# Patient Record
Sex: MALE | Race: ASIAN | NOT HISPANIC OR LATINO
[De-identification: names, ages, dates, MRNs, and addresses within clinical notes are randomized per-mention and may not be internally consistent; named-entity substitution may affect disease eponyms.]

---

## 2017-06-20 ENCOUNTER — LAB SERVICES (OUTPATIENT)
Dept: OTHER | Age: 82
End: 2017-06-20

## 2017-06-20 ENCOUNTER — CHARTING TRANS (OUTPATIENT)
Dept: OTHER | Age: 82
End: 2017-06-20

## 2017-06-21 ENCOUNTER — CHARTING TRANS (OUTPATIENT)
Dept: OTHER | Age: 82
End: 2017-06-21

## 2017-06-21 LAB
ALBUMIN SERPL-MCNC: 3.6 G/DL (ref 3.6–5.1)
ALBUMIN/GLOB SERPL: 1 (ref 1–2.4)
ALP SERPL-CCNC: 102 UNITS/L (ref 45–117)
ALT SERPL-CCNC: 28 UNITS/L
ANION GAP SERPL CALC-SCNC: 14 MMOL/L (ref 10–20)
AST SERPL-CCNC: 27 UNITS/L
BASOPHILS # BLD: 0 K/MCL (ref 0–0.3)
BASOPHILS NFR BLD: 1 %
BILIRUB SERPL-MCNC: 0.3 MG/DL (ref 0.2–1)
BUN SERPL-MCNC: 16 MG/DL (ref 6–20)
BUN/CREAT SERPL: 18 (ref 7–25)
CALCIUM SERPL-MCNC: 8.8 MG/DL (ref 8.4–10.2)
CHLORIDE SERPL-SCNC: 108 MMOL/L (ref 98–107)
CHOLEST SERPL-MCNC: 93 MG/DL
CHOLEST/HDLC SERPL: 2.4
CO2 SERPL-SCNC: 24 MMOL/L (ref 21–32)
CREAT SERPL-MCNC: 0.87 MG/DL (ref 0.67–1.17)
DIFFERENTIAL METHOD BLD: ABNORMAL
EOSINOPHIL # BLD: 0 K/MCL (ref 0.1–0.5)
EOSINOPHIL NFR BLD: 0 %
ERYTHROCYTE [DISTWIDTH] IN BLOOD: 17 % (ref 11–15)
GLOBULIN SER-MCNC: 3.5 G/DL (ref 2–4)
GLUCOSE SERPL-MCNC: 108 MG/DL (ref 65–99)
HDLC SERPL-MCNC: 38 MG/DL
HEMATOCRIT: 24.3 % (ref 39–51)
HEMOGLOBIN: 7.2 G/DL (ref 13–17)
IRON SERPL-MCNC: 135 MCG/DL (ref 65–175)
LDLC SERPL CALC-MCNC: 39 MG/DL
LENGTH OF FAST TIME PATIENT: 12 HRS
LENGTH OF FAST TIME PATIENT: 12 HRS
LYMPHOCYTES # BLD: 0.7 K/MCL (ref 1–4)
LYMPHOCYTES NFR BLD: 32 %
MEAN CORPUSCULAR HEMOGLOBIN: 25.4 PG (ref 26–34)
MEAN CORPUSCULAR HGB CONC: 29.6 G/DL (ref 32–36.5)
MEAN CORPUSCULAR VOLUME: 85.6 FL (ref 78–100)
MONOCYTES # BLD: 0.3 K/MCL (ref 0.3–0.9)
MONOCYTES NFR BLD: 14 %
NEUTROPHILS # BLD: 1.2 K/MCL (ref 1.8–7.7)
NEUTROPHILS NFR BLD: 53 %
NONHDLC SERPL-MCNC: 55 MG/DL
PLATELET COUNT: 182 K/MCL (ref 140–450)
POTASSIUM SERPL-SCNC: 4.6 MMOL/L (ref 3.4–5.1)
RED CELL COUNT: 2.84 MIL/MCL (ref 4.5–5.9)
SODIUM SERPL-SCNC: 141 MMOL/L (ref 135–145)
TOTAL PROTEIN: 7.1 G/DL (ref 6.4–8.2)
TRIGL SERPL-MCNC: 78 MG/DL
TSH SERPL-ACNC: 2.03 MCUNITS/ML (ref 0.35–5)
WHITE BLOOD COUNT: 2.2 K/MCL (ref 4.2–11)

## 2017-06-26 ENCOUNTER — CHARTING TRANS (OUTPATIENT)
Dept: OTHER | Age: 82
End: 2017-06-26

## 2017-06-26 ASSESSMENT — PAIN SCALES - GENERAL: PAINLEVEL_OUTOF10: 0

## 2017-06-27 LAB
FOLATE SERPL-MCNC: 10.1 NG/ML
VIT B12 SERPL-MCNC: 589 PG/ML (ref 211–911)

## 2017-07-03 ENCOUNTER — LAB SERVICES (OUTPATIENT)
Dept: OTHER | Age: 82
End: 2017-07-03

## 2017-07-06 LAB — HEMOCCULT STL QL: NEGATIVE

## 2017-07-11 ENCOUNTER — LAB SERVICES (OUTPATIENT)
Dept: OTHER | Age: 82
End: 2017-07-11

## 2017-07-11 ENCOUNTER — CHARTING TRANS (OUTPATIENT)
Dept: OTHER | Age: 82
End: 2017-07-11

## 2017-07-12 ENCOUNTER — CHARTING TRANS (OUTPATIENT)
Dept: OTHER | Age: 82
End: 2017-07-12

## 2017-07-12 LAB
BASOPHILS # BLD: 0 K/MCL (ref 0–0.3)
BASOPHILS NFR BLD: 0 %
DIFFERENTIAL METHOD BLD: ABNORMAL
EOSINOPHIL # BLD: 0 K/MCL (ref 0.1–0.5)
EOSINOPHIL NFR BLD: 0 %
ERYTHROCYTE [DISTWIDTH] IN BLOOD: 18.9 % (ref 11–15)
HEMATOCRIT: 29.8 % (ref 39–51)
HEMOGLOBIN: 8.8 G/DL (ref 13–17)
LYMPHOCYTES # BLD: 1.1 K/MCL (ref 1–4)
LYMPHOCYTES NFR BLD: 40 %
MEAN CORPUSCULAR HEMOGLOBIN: 24.6 PG (ref 26–34)
MEAN CORPUSCULAR HGB CONC: 29.5 G/DL (ref 32–36.5)
MEAN CORPUSCULAR VOLUME: 83.5 FL (ref 78–100)
MONOCYTES # BLD: 0.3 K/MCL (ref 0.3–0.9)
MONOCYTES NFR BLD: 9 %
NEUTROPHILS # BLD: 1.4 K/MCL (ref 1.8–7.7)
NEUTROPHILS NFR BLD: 51 %
PLATELET COUNT: 202 K/MCL (ref 140–450)
RED CELL COUNT: 3.57 MIL/MCL (ref 4.5–5.9)
RETICULOCYTE HEMOGLOBIN CONTENT (RETHE): 24.2 PG (ref 28.6–36.3)
WHITE BLOOD COUNT: 2.8 K/MCL (ref 4.2–11)

## 2017-11-17 ENCOUNTER — OFFICE VISIT (OUTPATIENT)
Dept: AUDIOLOGY | Facility: CLINIC | Age: 82
End: 2017-11-17

## 2017-11-17 DIAGNOSIS — H90.3 BILATERAL SENSORINEURAL HEARING LOSS: Primary | ICD-10-CM

## 2017-11-17 PROCEDURE — 92593 HEARING AID CHECK, BOTH EARS: CPT | Performed by: AUDIOLOGIST

## 2017-11-17 NOTE — PROGRESS NOTES
HEARING AID FOLLOW-UP    Alma Case  1/29/1935  [id number]    Patient is here for hearing aid follow up. Last visit was one year ago.       The patient has the following concerns:   Patient here with wife and reported that both aids were lost.   They

## 2017-12-07 ENCOUNTER — OFFICE VISIT (OUTPATIENT)
Dept: AUDIOLOGY | Facility: CLINIC | Age: 82
End: 2017-12-07

## 2017-12-07 DIAGNOSIS — H90.3 SENSORINEURAL HEARING LOSS, BILATERAL: Primary | ICD-10-CM

## 2017-12-07 PROCEDURE — V5299 HEARING SERVICE: HCPCS | Performed by: AUDIOLOGIST

## 2017-12-07 PROCEDURE — 92593 HEARING AID CHECK, BOTH EARS: CPT | Performed by: AUDIOLOGIST

## 2017-12-08 NOTE — PROGRESS NOTES
HEARING AID FOLLOW-UP    [facility]  1/29/1935  [id number]    Patient is here for  of replacement aid for right ear under [employer] loss/damage policy. The patient has the following concerns:      In office the following actions were taken:  Martha Cadena

## 2018-03-08 ENCOUNTER — LAB SERVICES (OUTPATIENT)
Dept: OTHER | Age: 83
End: 2018-03-08

## 2018-03-08 ENCOUNTER — CHARTING TRANS (OUTPATIENT)
Dept: OTHER | Age: 83
End: 2018-03-08

## 2018-03-09 ENCOUNTER — LAB SERVICES (OUTPATIENT)
Dept: OTHER | Age: 83
End: 2018-03-09

## 2018-03-09 ENCOUNTER — HOSPITAL (OUTPATIENT)
Dept: OTHER | Age: 83
End: 2018-03-09
Attending: INTERNAL MEDICINE

## 2018-03-09 ENCOUNTER — CHARTING TRANS (OUTPATIENT)
Dept: OTHER | Age: 83
End: 2018-03-09

## 2018-03-09 LAB
ALBUMIN SERPL-MCNC: 3.6 G/DL (ref 3.6–5.1)
ALBUMIN/GLOB SERPL: 1.2 (ref 1–2.4)
ALP SERPL-CCNC: 84 UNITS/L (ref 45–117)
ALT SERPL-CCNC: 22 UNITS/L
ANALYZER ANC (IANC): ABNORMAL
ANALYZER ANC (IANC): ABNORMAL
ANION GAP SERPL CALC-SCNC: 12 MMOL/L (ref 10–20)
ANION GAP SERPL CALC-SCNC: 12 MMOL/L (ref 10–20)
ANION GAP SERPL CALC-SCNC: 13 MMOL/L (ref 10–20)
AST SERPL-CCNC: 22 UNITS/L
BASOPHILS # BLD: 0 K/MCL (ref 0–0.3)
BASOPHILS # BLD: 0 K/MCL (ref 0–0.3)
BASOPHILS # BLD: 0 THOUSAND/MCL (ref 0–0.3)
BASOPHILS NFR BLD: 0 %
BILIRUB SERPL-MCNC: 0.6 MG/DL (ref 0.2–1)
BLOOD BANK CMNT PATIENT-IMP: NORMAL
BUN SERPL-MCNC: 17 MG/DL (ref 6–20)
BUN SERPL-MCNC: 21 MG/DL (ref 6–20)
BUN SERPL-MCNC: 21 MG/DL (ref 6–20)
BUN/CREAT SERPL: 19 (ref 7–25)
BUN/CREAT SERPL: 22 (ref 7–25)
BUN/CREAT SERPL: 22 (ref 7–25)
CALCIUM SERPL-MCNC: 8.9 MG/DL (ref 8.4–10.2)
CHLORIDE SERPL-SCNC: 107 MMOL/L (ref 98–107)
CHLORIDE SERPL-SCNC: 109 MMOL/L (ref 98–107)
CHLORIDE: 107 MMOL/L (ref 98–107)
CO2 SERPL-SCNC: 24 MMOL/L (ref 21–32)
CO2 SERPL-SCNC: 26 MMOL/L (ref 21–32)
CO2 SERPL-SCNC: 26 MMOL/L (ref 21–32)
CREAT SERPL-MCNC: 0.88 MG/DL (ref 0.67–1.17)
CREAT SERPL-MCNC: 0.96 MG/DL (ref 0.67–1.17)
CREAT SERPL-MCNC: 0.96 MG/DL (ref 0.67–1.17)
DAT POLY-SP REAG RBC QL: NEGATIVE
DIFFERENTIAL METHOD BLD: ABNORMAL
EOSINOPHIL # BLD: 0.1 K/MCL (ref 0.1–0.5)
EOSINOPHIL # BLD: 0.1 K/MCL (ref 0.1–0.5)
EOSINOPHIL # BLD: 0.1 THOUSAND/MCL (ref 0.1–0.5)
EOSINOPHIL NFR BLD: 1 %
EOSINOPHIL NFR BLD: 3 %
EOSINOPHIL NFR BLD: 3 %
ERYTHROCYTE [DISTWIDTH] IN BLOOD: 18.7 % (ref 11–15)
ERYTHROCYTE [DISTWIDTH] IN BLOOD: 18.7 % (ref 11–15)
ERYTHROCYTE [DISTWIDTH] IN BLOOD: 18.8 % (ref 11–15)
FOLATE SERPL-MCNC: 12.9 NG/ML
GLOBULIN SER-MCNC: 3.1 G/DL (ref 2–4)
GLUCOSE SERPL-MCNC: 96 MG/DL (ref 65–99)
GLUCOSE SERPL-MCNC: 97 MG/DL (ref 65–99)
GLUCOSE SERPL-MCNC: 97 MG/DL (ref 65–99)
HEMATOCRIT: 22.3 % (ref 39–51)
HEMATOCRIT: 22.3 % (ref 39–51)
HEMATOCRIT: 24.1 % (ref 39–51)
HEMOGLOBIN: 6.5 G/DL (ref 13–17)
HEMOGLOBIN: 7 G/DL (ref 13–17)
HGB BLD-MCNC: 6.5 GM/DL (ref 13–17)
HYPOCHROMIA (HYPOC): ABNORMAL
HYPOCHROMIA (HYPOC): ABNORMAL
IRON SATN MFR SERPL: 38 % (ref 15–45)
IRON SATN MFR SERPL: 38 % (ref 15–45)
IRON SERPL-MCNC: 130 MCG/DL (ref 65–175)
IRON SERPL-MCNC: 130 MCG/DL (ref 65–175)
IRON SERPL-MCNC: 25 MCG/DL (ref 65–175)
LDH SERPL L TO P-CCNC: 236 UNITS/L (ref 86–234)
LDH SERPL-CCNC: 236 UNIT/L (ref 86–234)
LENGTH OF FAST TIME PATIENT: ABNORMAL HRS
LYMPHOCYTES # BLD: 0.7 K/MCL (ref 1–4)
LYMPHOCYTES # BLD: 0.7 K/MCL (ref 1–4)
LYMPHOCYTES # BLD: 0.7 THOUSAND/MCL (ref 1–4)
LYMPHOCYTES NFR BLD: 13 %
LYMPHOCYTES NFR BLD: 15 %
LYMPHOCYTES NFR BLD: 15 %
MACROCYTOSIS (MACRO): ABNORMAL
MCH RBC QN AUTO: 29.7 PG (ref 26–34)
MCHC RBC AUTO-ENTMCNC: 29.1 GM/DL (ref 32–36.5)
MCV RBC AUTO: 101.8 FL (ref 78–100)
MEAN CORPUSCULAR HEMOGLOBIN: 29.7 PG (ref 26–34)
MEAN CORPUSCULAR HEMOGLOBIN: 30.2 PG (ref 26–34)
MEAN CORPUSCULAR HGB CONC: 29 G/DL (ref 32–36.5)
MEAN CORPUSCULAR HGB CONC: 29.1 G/DL (ref 32–36.5)
MEAN CORPUSCULAR VOLUME: 101.8 FL (ref 78–100)
MEAN CORPUSCULAR VOLUME: 103.9 FL (ref 78–100)
MICROCYTOSIS (MICY): ABNORMAL
MICROCYTOSIS (MICY): ABNORMAL
MONOCYTES # BLD: 0.2 K/MCL (ref 0.3–0.9)
MONOCYTES # BLD: 0.2 K/MCL (ref 0.3–0.9)
MONOCYTES # BLD: 0.2 THOUSAND/MCL (ref 0.3–0.9)
MONOCYTES NFR BLD: 4 %
MONOCYTES NFR BLD: 5 %
MONOCYTES NFR BLD: 5 %
NEUTROPHILS # BLD: 3.5 K/MCL (ref 1.8–7.7)
NEUTROPHILS # BLD: 3.5 THOUSAND/MCL (ref 1.8–7.7)
NEUTROPHILS # BLD: 4.4 K/MCL (ref 1.8–7.7)
NEUTROPHILS NFR BLD: 77 %
NEUTROPHILS NFR BLD: 77 %
NEUTROPHILS NFR BLD: 82 %
NEUTS SEG NFR BLD: ABNORMAL
NEUTS SEG NFR BLD: ABNORMAL %
NRBC (NRBCRE): ABNORMAL
NUM BPU REQUESTED: 1
PERCENT NRBC: ABNORMAL
PLATELET # BLD: 187 THOUSAND/MCL (ref 140–450)
PLATELET COUNT: 187 K/MCL (ref 140–450)
PLATELET COUNT: 218 K/MCL (ref 140–450)
POLYCHROMASIA (POLY): ABNORMAL
POTASSIUM SERPL-SCNC: 4.2 MMOL/L (ref 3.4–5.1)
POTASSIUM SERPL-SCNC: 4.2 MMOL/L (ref 3.4–5.1)
POTASSIUM SERPL-SCNC: 4.4 MMOL/L (ref 3.4–5.1)
RBC # BLD: 2.19 MILLION/MCL (ref 4.5–5.9)
RED CELL COUNT: 2.19 MIL/MCL (ref 4.5–5.9)
RED CELL COUNT: 2.32 MIL/MCL (ref 4.5–5.9)
RETICULOCYTE HEMOGLOBIN CONTENT (RETHE): 24.4 PG (ref 28.6–36.3)
SODIUM SERPL-SCNC: 141 MMOL/L (ref 135–145)
SODIUM SERPL-SCNC: 141 MMOL/L (ref 135–145)
SODIUM SERPL-SCNC: 142 MMOL/L (ref 135–145)
TIBC SERPL-MCNC: 345 MCG/DL (ref 250–450)
TIBC SERPL-MCNC: 345 MCG/DL (ref 250–450)
TOTAL PROTEIN: 6.7 G/DL (ref 6.4–8.2)
VIT B12 SERPL-MCNC: 455 PG/ML (ref 211–911)
WBC # BLD: 4.6 THOUSAND/MCL (ref 4.2–11)
WHITE BLOOD COUNT: 4.6 K/MCL (ref 4.2–11)
WHITE BLOOD COUNT: 5.4 K/MCL (ref 4.2–11)

## 2018-03-10 ENCOUNTER — DIAGNOSTIC TRANS (OUTPATIENT)
Dept: OTHER | Age: 83
End: 2018-03-10

## 2018-03-10 ENCOUNTER — CHARTING TRANS (OUTPATIENT)
Dept: OTHER | Age: 83
End: 2018-03-10

## 2018-03-10 ENCOUNTER — LAB SERVICES (OUTPATIENT)
Dept: OTHER | Age: 83
End: 2018-03-10

## 2018-03-10 LAB
ANALYZER ANC (IANC): ABNORMAL
ANALYZER ANC (IANC): ABNORMAL
ANION GAP SERPL CALC-SCNC: 15 MMOL/L (ref 10–20)
ANION GAP SERPL CALC-SCNC: 15 MMOL/L (ref 10–20)
BASOPHILS # BLD: 0 K/MCL (ref 0–0.3)
BASOPHILS # BLD: 0 THOUSAND/MCL (ref 0–0.3)
BASOPHILS NFR BLD: 0 %
BASOPHILS NFR BLD: 0 %
BUN SERPL-MCNC: 15 MG/DL (ref 6–20)
BUN SERPL-MCNC: 15 MG/DL (ref 6–20)
BUN/CREAT SERPL: 15 (ref 7–25)
BUN/CREAT SERPL: 15 (ref 7–25)
CALCIUM SERPL-MCNC: 8.9 MG/DL (ref 8.4–10.2)
CALCIUM SERPL-MCNC: 8.9 MG/DL (ref 8.4–10.2)
CHLORIDE SERPL-SCNC: 107 MMOL/L (ref 98–107)
CHLORIDE: 107 MMOL/L (ref 98–107)
CO2 SERPL-SCNC: 23 MMOL/L (ref 21–32)
CO2 SERPL-SCNC: 23 MMOL/L (ref 21–32)
CREAT SERPL-MCNC: 0.97 MG/DL (ref 0.67–1.17)
CREAT SERPL-MCNC: 0.97 MG/DL (ref 0.67–1.17)
DIFFERENTIAL METHOD BLD: ABNORMAL
DIFFERENTIAL METHOD BLD: ABNORMAL
EOSINOPHIL # BLD: 0.2 K/MCL (ref 0.1–0.5)
EOSINOPHIL # BLD: 0.2 THOUSAND/MCL (ref 0.1–0.5)
EOSINOPHIL NFR BLD: 5 %
EOSINOPHIL NFR BLD: 5 %
ERYTHROCYTE [DISTWIDTH] IN BLOOD: 21.1 % (ref 11–15)
ERYTHROCYTE [DISTWIDTH] IN BLOOD: 21.1 % (ref 11–15)
FOLATE SERPL-MCNC: 12.9 NG/ML
FOLATE SERPL-MCNC: 13.9 NG/ML
GLUCOSE SERPL-MCNC: 95 MG/DL (ref 65–99)
GLUCOSE SERPL-MCNC: 95 MG/DL (ref 65–99)
HEMATOCRIT: 27.5 % (ref 39–51)
HEMATOCRIT: 27.5 % (ref 39–51)
HEMOGLOBIN: 8.4 G/DL (ref 13–17)
HGB BLD-MCNC: 8.4 GM/DL (ref 13–17)
IMMATURE RETIC FRACTION: 26.8 % (ref 1.5–16)
LYMPHOCYTES # BLD: 0.8 K/MCL (ref 1–4)
LYMPHOCYTES # BLD: 0.8 THOUSAND/MCL (ref 1–4)
LYMPHOCYTES NFR BLD: 19 %
LYMPHOCYTES NFR BLD: 19 %
MCH RBC QN AUTO: 29 PG (ref 26–34)
MCHC RBC AUTO-ENTMCNC: 30.5 GM/DL (ref 32–36.5)
MCV RBC AUTO: 94.8 FL (ref 78–100)
MEAN CORPUSCULAR HEMOGLOBIN: 29 PG (ref 26–34)
MEAN CORPUSCULAR HGB CONC: 30.5 G/DL (ref 32–36.5)
MEAN CORPUSCULAR VOLUME: 94.8 FL (ref 78–100)
MONOCYTES # BLD: 0.3 K/MCL (ref 0.3–0.9)
MONOCYTES # BLD: 0.3 THOUSAND/MCL (ref 0.3–0.9)
MONOCYTES NFR BLD: 8 %
MONOCYTES NFR BLD: 8 %
NEUTROPHILS # BLD: 2.8 K/MCL (ref 1.8–7.7)
NEUTROPHILS # BLD: 2.8 THOUSAND/MCL (ref 1.8–7.7)
NEUTROPHILS NFR BLD: 68 %
NEUTROPHILS NFR BLD: 68 %
NEUTS SEG NFR BLD: ABNORMAL
NEUTS SEG NFR BLD: ABNORMAL %
NRBC (NRBCRE): ABNORMAL
PERCENT NRBC: ABNORMAL
PLATELET # BLD: 167 THOUSAND/MCL (ref 140–450)
PLATELET COUNT: 167 K/MCL (ref 140–450)
POTASSIUM SERPL-SCNC: 3.9 MMOL/L (ref 3.4–5.1)
POTASSIUM SERPL-SCNC: 3.9 MMOL/L (ref 3.4–5.1)
RBC # BLD: 2.9 MILLION/MCL (ref 4.5–5.9)
RED CELL COUNT: 2.9 MIL/MCL (ref 4.5–5.9)
RETICS #: 150 THOUSAND/MCL (ref 10–120)
RETICS/RBC NFR: 5.2 % (ref 0.3–2.5)
SODIUM SERPL-SCNC: 141 MMOL/L (ref 135–145)
SODIUM SERPL-SCNC: 141 MMOL/L (ref 135–145)
VIT B12 SERPL-MCNC: 455 PG/ML (ref 211–911)
VIT B12 SERPL-MCNC: 492 PG/ML (ref 211–911)
WBC # BLD: 4.1 THOUSAND/MCL (ref 4.2–11)
WHITE BLOOD COUNT: 4.1 K/MCL (ref 4.2–11)

## 2018-03-11 LAB
ABO + RH BLD: NORMAL
BLD GP AB SCN SERPL QL: NEGATIVE
BLD PROD TYP BPU: NORMAL
BLD UNIT ID BPU: NORMAL
CROSSMATCH EXPIRE: NORMAL
FOLATE SERPL-MCNC: 13.9 NG/ML
MAJ XM SERPL-IMP: NORMAL
NUM BPU REQUESTED: 2
STATUS OF UNIT: NORMAL
TRANSFUSION STATUS: NORMAL
UNIT DIVISION: 0
VIT B12 SERPL-MCNC: 492 PG/ML (ref 211–911)

## 2018-03-15 ENCOUNTER — CHARTING TRANS (OUTPATIENT)
Dept: OTHER | Age: 83
End: 2018-03-15

## 2018-03-15 ENCOUNTER — LAB SERVICES (OUTPATIENT)
Dept: OTHER | Age: 83
End: 2018-03-15

## 2018-03-16 ENCOUNTER — CHARTING TRANS (OUTPATIENT)
Dept: OTHER | Age: 83
End: 2018-03-16

## 2018-03-16 LAB
HEMATOCRIT: 32.7 % (ref 39–51)
HEMOGLOBIN: 9.8 G/DL (ref 13–17)

## 2018-04-10 ENCOUNTER — IMAGING SERVICES (OUTPATIENT)
Dept: OTHER | Age: 83
End: 2018-04-10

## 2018-04-10 ENCOUNTER — CHARTING TRANS (OUTPATIENT)
Dept: OTHER | Age: 83
End: 2018-04-10

## 2018-04-11 ENCOUNTER — LAB SERVICES (OUTPATIENT)
Dept: OTHER | Age: 83
End: 2018-04-11

## 2018-04-12 ENCOUNTER — CHARTING TRANS (OUTPATIENT)
Dept: OTHER | Age: 83
End: 2018-04-12

## 2018-04-12 LAB
ALBUMIN SERPL-MCNC: 3.7 G/DL (ref 3.6–5.1)
ALBUMIN/GLOB SERPL: 1 (ref 1–2.4)
ALP SERPL-CCNC: 99 UNITS/L (ref 45–117)
ALT SERPL-CCNC: 26 UNITS/L
AMORPH SED URNS QL MICRO: PRESENT
ANION GAP SERPL CALC-SCNC: 15 MMOL/L (ref 10–20)
APPEARANCE UR: CLEAR
AST SERPL-CCNC: 26 UNITS/L
BACTERIA #/AREA URNS HPF: ABNORMAL /HPF
BASOPHILS # BLD: 0 K/MCL (ref 0–0.3)
BASOPHILS NFR BLD: 0 %
BILIRUB SERPL-MCNC: 0.5 MG/DL (ref 0.2–1)
BILIRUB UR QL: NEGATIVE
BUN SERPL-MCNC: 17 MG/DL (ref 6–20)
BUN/CREAT SERPL: 23 (ref 7–25)
CALCIUM SERPL-MCNC: 9 MG/DL (ref 8.4–10.2)
CHLORIDE SERPL-SCNC: 105 MMOL/L (ref 98–107)
CHOLEST SERPL-MCNC: 102 MG/DL
CHOLEST/HDLC SERPL: 2.8
CO2 SERPL-SCNC: 26 MMOL/L (ref 21–32)
COLOR UR: YELLOW
CREAT SERPL-MCNC: 0.73 MG/DL (ref 0.67–1.17)
DIFFERENTIAL METHOD BLD: ABNORMAL
EOSINOPHIL # BLD: 0 K/MCL (ref 0.1–0.5)
EOSINOPHIL NFR BLD: 0 %
ERYTHROCYTE [DISTWIDTH] IN BLOOD: 16 % (ref 11–15)
GLOBULIN SER-MCNC: 3.8 G/DL (ref 2–4)
GLUCOSE SERPL-MCNC: 108 MG/DL (ref 65–99)
GLUCOSE UR-MCNC: NEGATIVE MG/DL
HDLC SERPL-MCNC: 37 MG/DL
HEMATOCRIT: 33.2 % (ref 39–51)
HEMOGLOBIN: 10.2 G/DL (ref 13–17)
HYALINE CASTS #/AREA URNS LPF: ABNORMAL /LPF (ref 0–5)
IMM GRANULOCYTES # BLD AUTO: 0 K/MCL (ref 0–0.2)
IMM GRANULOCYTES NFR BLD: 1 %
KETONES UR-MCNC: NEGATIVE MG/DL
LDLC SERPL CALC-MCNC: 54 MG/DL
LENGTH OF FAST TIME PATIENT: 12 HRS
LENGTH OF FAST TIME PATIENT: 12 HRS
LYMPHOCYTES # BLD: 1 K/MCL (ref 1–4)
LYMPHOCYTES NFR BLD: 30 %
MEAN CORPUSCULAR HEMOGLOBIN: 29.6 PG (ref 26–34)
MEAN CORPUSCULAR HGB CONC: 30.7 G/DL (ref 32–36.5)
MEAN CORPUSCULAR VOLUME: 96.2 FL (ref 78–100)
MONOCYTES # BLD: 0.3 K/MCL (ref 0.3–0.9)
MONOCYTES NFR BLD: 8 %
MUCOUS THREADS URNS QL MICRO: PRESENT
NEUTROPHILS # BLD: 2 K/MCL (ref 1.8–7.7)
NEUTROPHILS NFR BLD: 61 %
NITRITE UR QL: NEGATIVE
NONHDLC SERPL-MCNC: 65 MG/DL
NRBC (NRBCRE): 0 %
PH UR: 5 UNITS (ref 5–7)
PLATELET COUNT: 178 K/MCL (ref 140–450)
POTASSIUM SERPL-SCNC: 4.2 MMOL/L (ref 3.4–5.1)
PROT UR QL: NEGATIVE MG/DL
RBC #/AREA URNS HPF: ABNORMAL /HPF (ref 0–3)
RBC-URINE: ABNORMAL
RED CELL COUNT: 3.45 MIL/MCL (ref 4.5–5.9)
SODIUM SERPL-SCNC: 142 MMOL/L (ref 135–145)
SP GR UR: 1.02 (ref 1–1.03)
SPECIMEN SOURCE: ABNORMAL
SQUAMOUS #/AREA URNS HPF: ABNORMAL /HPF (ref 0–5)
TOTAL PROTEIN: 7.5 G/DL (ref 6.4–8.2)
TRIGL SERPL-MCNC: 57 MG/DL
UROBILINOGEN UR QL: 0.2 MG/DL (ref 0–1)
WBC #/AREA URNS HPF: ABNORMAL /HPF (ref 0–5)
WBC-URINE: NEGATIVE
WHITE BLOOD COUNT: 3.3 K/MCL (ref 4.2–11)

## 2018-07-13 ENCOUNTER — CHARTING TRANS (OUTPATIENT)
Dept: OTHER | Age: 83
End: 2018-07-13

## 2018-07-16 ENCOUNTER — CHARTING TRANS (OUTPATIENT)
Dept: OTHER | Age: 83
End: 2018-07-16

## 2018-07-16 ENCOUNTER — LAB SERVICES (OUTPATIENT)
Dept: OTHER | Age: 83
End: 2018-07-16

## 2018-07-17 ENCOUNTER — CHARTING TRANS (OUTPATIENT)
Dept: OTHER | Age: 83
End: 2018-07-17

## 2018-07-17 LAB
ALBUMIN SERPL-MCNC: 3.8 G/DL (ref 3.6–5.1)
ALBUMIN/GLOB SERPL: 1.1 (ref 1–2.4)
ALP SERPL-CCNC: 95 UNITS/L (ref 45–117)
ALT SERPL-CCNC: 23 UNITS/L
ANION GAP SERPL CALC-SCNC: 11 MMOL/L (ref 10–20)
AST SERPL-CCNC: 26 UNITS/L
BASOPHILS # BLD: 0 K/MCL (ref 0–0.3)
BASOPHILS NFR BLD: 0 %
BILIRUB SERPL-MCNC: 0.4 MG/DL (ref 0.2–1)
BUN SERPL-MCNC: 15 MG/DL (ref 6–20)
BUN/CREAT SERPL: 16 (ref 7–25)
CALCIUM SERPL-MCNC: 9.3 MG/DL (ref 8.4–10.2)
CHLORIDE SERPL-SCNC: 105 MMOL/L (ref 98–107)
CHOLEST SERPL-MCNC: 135 MG/DL
CHOLEST/HDLC SERPL: 3.5
CO2 SERPL-SCNC: 29 MMOL/L (ref 21–32)
CREAT SERPL-MCNC: 0.92 MG/DL (ref 0.67–1.17)
DIFFERENTIAL METHOD BLD: ABNORMAL
EOSINOPHIL # BLD: 0.1 K/MCL (ref 0.1–0.5)
EOSINOPHIL NFR BLD: 2 %
ERYTHROCYTE [DISTWIDTH] IN BLOOD: 15.3 % (ref 11–15)
GLOBULIN SER-MCNC: 3.4 G/DL (ref 2–4)
GLUCOSE SERPL-MCNC: 98 MG/DL (ref 65–99)
HDLC SERPL-MCNC: 39 MG/DL
HEMATOCRIT: 35.9 % (ref 39–51)
HEMOGLOBIN: 10.8 G/DL (ref 13–17)
IMM GRANULOCYTES # BLD AUTO: 0 K/MCL (ref 0–0.2)
IMM GRANULOCYTES NFR BLD: 0 %
LDLC SERPL CALC-MCNC: 70 MG/DL
LENGTH OF FAST TIME PATIENT: ABNORMAL HRS
LENGTH OF FAST TIME PATIENT: ABNORMAL HRS
LYMPHOCYTES # BLD: 1.1 K/MCL (ref 1–4)
LYMPHOCYTES NFR BLD: 24 %
MEAN CORPUSCULAR HEMOGLOBIN: 29 PG (ref 26–34)
MEAN CORPUSCULAR HGB CONC: 30.1 G/DL (ref 32–36.5)
MEAN CORPUSCULAR VOLUME: 96.2 FL (ref 78–100)
MONOCYTES # BLD: 0.3 K/MCL (ref 0.3–0.9)
MONOCYTES NFR BLD: 7 %
NEUTROPHILS # BLD: 3 K/MCL (ref 1.8–7.7)
NEUTROPHILS NFR BLD: 67 %
NONHDLC SERPL-MCNC: 96 MG/DL
NRBC (NRBCRE): 0 /100 WBC
PLATELET COUNT: 189 K/MCL (ref 140–450)
POTASSIUM SERPL-SCNC: 4.6 MMOL/L (ref 3.4–5.1)
RED CELL COUNT: 3.73 MIL/MCL (ref 4.5–5.9)
SODIUM SERPL-SCNC: 140 MMOL/L (ref 135–145)
TOTAL PROTEIN: 7.2 G/DL (ref 6.4–8.2)
TRIGL SERPL-MCNC: 130 MG/DL
WHITE BLOOD COUNT: 4.6 K/MCL (ref 4.2–11)

## 2018-08-06 ENCOUNTER — CHARTING TRANS (OUTPATIENT)
Dept: OTHER | Age: 83
End: 2018-08-06

## 2018-08-13 ENCOUNTER — CHARTING TRANS (OUTPATIENT)
Dept: OTHER | Age: 83
End: 2018-08-13

## 2018-08-20 ENCOUNTER — TELEPHONE (OUTPATIENT)
Dept: AUDIOLOGY | Facility: CLINIC | Age: 83
End: 2018-08-20

## 2018-08-20 NOTE — TELEPHONE ENCOUNTER
Pt. Requesting to speak to the Audiologist, as he would like to order a part for his hearing aid. He explains that he needs to get the tip to the part that looks like a tooth pick and it goes in the ear and that it is a 8 pack.  Pt. Requesting for that part

## 2018-09-28 ENCOUNTER — CHARTING TRANS (OUTPATIENT)
Dept: OTHER | Age: 83
End: 2018-09-28

## 2018-09-28 ENCOUNTER — IMAGING SERVICES (OUTPATIENT)
Dept: OTHER | Age: 83
End: 2018-09-28

## 2018-10-31 VITALS
SYSTOLIC BLOOD PRESSURE: 130 MMHG | BODY MASS INDEX: 23.7 KG/M2 | RESPIRATION RATE: 16 BRPM | TEMPERATURE: 99.7 F | HEART RATE: 74 BPM | DIASTOLIC BLOOD PRESSURE: 80 MMHG | WEIGHT: 160 LBS | HEIGHT: 69 IN

## 2018-10-31 VITALS
RESPIRATION RATE: 16 BRPM | DIASTOLIC BLOOD PRESSURE: 62 MMHG | TEMPERATURE: 98.2 F | OXYGEN SATURATION: 95 % | SYSTOLIC BLOOD PRESSURE: 130 MMHG | WEIGHT: 156 LBS | HEART RATE: 58 BPM | BODY MASS INDEX: 23.11 KG/M2 | HEIGHT: 69 IN

## 2018-11-01 VITALS
SYSTOLIC BLOOD PRESSURE: 122 MMHG | WEIGHT: 168.5 LBS | TEMPERATURE: 98.1 F | RESPIRATION RATE: 16 BRPM | OXYGEN SATURATION: 97 % | HEIGHT: 69 IN | DIASTOLIC BLOOD PRESSURE: 70 MMHG | HEART RATE: 67 BPM | BODY MASS INDEX: 24.96 KG/M2

## 2018-11-01 VITALS
DIASTOLIC BLOOD PRESSURE: 60 MMHG | OXYGEN SATURATION: 98 % | HEART RATE: 94 BPM | HEIGHT: 69 IN | RESPIRATION RATE: 18 BRPM | WEIGHT: 159.25 LBS | TEMPERATURE: 98 F | BODY MASS INDEX: 23.59 KG/M2 | SYSTOLIC BLOOD PRESSURE: 120 MMHG

## 2018-11-01 VITALS
WEIGHT: 172.25 LBS | OXYGEN SATURATION: 98 % | HEIGHT: 69 IN | TEMPERATURE: 98.6 F | DIASTOLIC BLOOD PRESSURE: 70 MMHG | SYSTOLIC BLOOD PRESSURE: 130 MMHG | BODY MASS INDEX: 25.51 KG/M2 | HEART RATE: 83 BPM | RESPIRATION RATE: 16 BRPM

## 2018-11-03 VITALS
RESPIRATION RATE: 16 BRPM | WEIGHT: 166 LBS | TEMPERATURE: 98.4 F | DIASTOLIC BLOOD PRESSURE: 60 MMHG | BODY MASS INDEX: 24.59 KG/M2 | SYSTOLIC BLOOD PRESSURE: 110 MMHG | HEART RATE: 65 BPM | HEIGHT: 69 IN

## 2018-11-03 VITALS
BODY MASS INDEX: 24.42 KG/M2 | WEIGHT: 164.91 LBS | RESPIRATION RATE: 15 BRPM | SYSTOLIC BLOOD PRESSURE: 157 MMHG | HEART RATE: 71 BPM | TEMPERATURE: 98.5 F | HEIGHT: 69 IN | DIASTOLIC BLOOD PRESSURE: 63 MMHG

## 2018-11-03 VITALS
BODY MASS INDEX: 23.4 KG/M2 | OXYGEN SATURATION: 97 % | HEIGHT: 69 IN | SYSTOLIC BLOOD PRESSURE: 120 MMHG | TEMPERATURE: 97 F | WEIGHT: 158 LBS | RESPIRATION RATE: 18 BRPM | HEART RATE: 68 BPM | DIASTOLIC BLOOD PRESSURE: 52 MMHG

## 2018-11-08 ENCOUNTER — CHARTING TRANS (OUTPATIENT)
Dept: OTHER | Age: 83
End: 2018-11-08

## 2018-11-08 ENCOUNTER — LAB SERVICES (OUTPATIENT)
Dept: OTHER | Age: 83
End: 2018-11-08

## 2018-11-09 ENCOUNTER — CHARTING TRANS (OUTPATIENT)
Dept: OTHER | Age: 83
End: 2018-11-09

## 2018-11-09 LAB
ALBUMIN SERPL-MCNC: 3.6 G/DL (ref 3.6–5.1)
ALBUMIN/GLOB SERPL: 1.1 (ref 1–2.4)
ALP SERPL-CCNC: 100 UNITS/L (ref 45–117)
ALT SERPL-CCNC: 21 UNITS/L
ANION GAP SERPL CALC-SCNC: 12 MMOL/L (ref 10–20)
AST SERPL-CCNC: 22 UNITS/L
BASOPHILS # BLD: 0 K/MCL (ref 0–0.3)
BASOPHILS NFR BLD: 1 %
BILIRUB SERPL-MCNC: 0.6 MG/DL (ref 0.2–1)
BUN SERPL-MCNC: 15 MG/DL (ref 6–20)
BUN/CREAT SERPL: 18 (ref 7–25)
CALCIUM SERPL-MCNC: 8.8 MG/DL (ref 8.4–10.2)
CHLORIDE SERPL-SCNC: 108 MMOL/L (ref 98–107)
CO2 SERPL-SCNC: 26 MMOL/L (ref 21–32)
CREAT SERPL-MCNC: 0.86 MG/DL (ref 0.67–1.17)
DIFFERENTIAL METHOD BLD: ABNORMAL
EOSINOPHIL # BLD: 0.1 K/MCL (ref 0.1–0.5)
EOSINOPHIL NFR BLD: 3 %
ERYTHROCYTE [DISTWIDTH] IN BLOOD: 15.9 % (ref 11–15)
GLOBULIN SER-MCNC: 3.4 G/DL (ref 2–4)
GLUCOSE SERPL-MCNC: 91 MG/DL (ref 65–99)
HEMATOCRIT: 33.1 % (ref 39–51)
HEMOGLOBIN: 10 G/DL (ref 13–17)
IMM GRANULOCYTES # BLD AUTO: 0 K/MCL (ref 0–0.2)
IMM GRANULOCYTES NFR BLD: 0 %
LENGTH OF FAST TIME PATIENT: ABNORMAL HRS
LYMPHOCYTES # BLD: 0.8 K/MCL (ref 1–4)
LYMPHOCYTES NFR BLD: 29 %
MEAN CORPUSCULAR HEMOGLOBIN: 28 PG (ref 26–34)
MEAN CORPUSCULAR HGB CONC: 30.2 G/DL (ref 32–36.5)
MEAN CORPUSCULAR VOLUME: 92.7 FL (ref 78–100)
MONOCYTES # BLD: 0.3 K/MCL (ref 0.3–0.9)
MONOCYTES NFR BLD: 10 %
NEUTROPHILS # BLD: 1.7 K/MCL (ref 1.8–7.7)
NEUTROPHILS NFR BLD: 57 %
NRBC (NRBCRE): 0 /100 WBC
PLATELET COUNT: 179 K/MCL (ref 140–450)
POTASSIUM SERPL-SCNC: 4.2 MMOL/L (ref 3.4–5.1)
RED CELL COUNT: 3.57 MIL/MCL (ref 4.5–5.9)
SODIUM SERPL-SCNC: 142 MMOL/L (ref 135–145)
TOTAL PROTEIN: 7 G/DL (ref 6.4–8.2)
WHITE BLOOD COUNT: 2.9 K/MCL (ref 4.2–11)

## 2018-11-27 VITALS
RESPIRATION RATE: 16 BRPM | HEART RATE: 71 BPM | TEMPERATURE: 98.3 F | BODY MASS INDEX: 23.7 KG/M2 | OXYGEN SATURATION: 94 % | DIASTOLIC BLOOD PRESSURE: 50 MMHG | WEIGHT: 160 LBS | HEIGHT: 69 IN | SYSTOLIC BLOOD PRESSURE: 120 MMHG

## 2018-11-27 VITALS
WEIGHT: 161 LBS | SYSTOLIC BLOOD PRESSURE: 110 MMHG | TEMPERATURE: 98 F | HEIGHT: 69 IN | HEART RATE: 78 BPM | RESPIRATION RATE: 16 BRPM | DIASTOLIC BLOOD PRESSURE: 70 MMHG | BODY MASS INDEX: 23.85 KG/M2

## 2018-11-27 VITALS
RESPIRATION RATE: 16 BRPM | SYSTOLIC BLOOD PRESSURE: 120 MMHG | WEIGHT: 163 LBS | HEART RATE: 76 BPM | DIASTOLIC BLOOD PRESSURE: 70 MMHG | TEMPERATURE: 97.8 F | BODY MASS INDEX: 24.14 KG/M2 | HEIGHT: 69 IN

## 2018-12-06 ENCOUNTER — TELEPHONE (OUTPATIENT)
Dept: AUDIOLOGY | Facility: CLINIC | Age: 83
End: 2018-12-06

## 2018-12-06 NOTE — TELEPHONE ENCOUNTER
Patient wanted to speak to BL. Explained that she is out of the office. Offered appts with other audiologists/ patient's wife declined.  Offered to have her drop off hearing aids so we can take a look/ patient will talk to her  and call us back

## 2018-12-10 NOTE — TELEPHONE ENCOUNTER
Called patient as aid was never dropped off. Offered her an appt for 900 tomorrow as BL had a cancellation. Patient said that was too early and she could not make it. She will drop off the aid at her convenience.

## 2018-12-11 ENCOUNTER — TELEPHONE (OUTPATIENT)
Dept: SCHEDULING | Age: 83
End: 2018-12-11

## 2018-12-11 ENCOUNTER — TELEPHONE (OUTPATIENT)
Dept: AUDIOLOGY | Facility: CLINIC | Age: 83
End: 2018-12-11

## 2018-12-11 ENCOUNTER — TELEPHONE (OUTPATIENT)
Dept: OTOLARYNGOLOGY | Facility: CLINIC | Age: 83
End: 2018-12-11

## 2018-12-11 RX ORDER — HYDROCORTISONE AND ACETIC ACID 1.1; 2.41 G/100ML; G/100ML
3 SOLUTION AURICULAR (OTIC) 2 TIMES DAILY
Qty: 1 BOTTLE | Refills: 2 | Status: SHIPPED | OUTPATIENT
Start: 2018-12-11 | End: 2021-09-22 | Stop reason: ALTCHOICE

## 2018-12-12 ENCOUNTER — TELEPHONE (OUTPATIENT)
Dept: AUDIOLOGY | Facility: CLINIC | Age: 83
End: 2018-12-12

## 2018-12-13 ENCOUNTER — OFFICE VISIT (OUTPATIENT)
Dept: AUDIOLOGY | Facility: CLINIC | Age: 83
End: 2018-12-13
Payer: MEDICARE

## 2018-12-13 DIAGNOSIS — H90.3 BILATERAL SENSORINEURAL HEARING LOSS: Primary | ICD-10-CM

## 2018-12-13 PROCEDURE — 92593 HEARING AID CHECK, BOTH EARS: CPT | Performed by: AUDIOLOGIST

## 2018-12-13 NOTE — PROGRESS NOTES
HEARING AID FOLLOW-UP    Alma Iqbal  1/29/1935  [id number]    Patient is here for hearing aid problem. Patient has Loura Median  hearing aid to right ear coupled to c-shell.    Patient also has CROS II unit for left ear, but does not typical

## 2018-12-31 ENCOUNTER — TELEPHONE (OUTPATIENT)
Dept: SCHEDULING | Age: 83
End: 2018-12-31

## 2019-01-04 ENCOUNTER — TELEPHONE (OUTPATIENT)
Dept: SCHEDULING | Age: 84
End: 2019-01-04

## 2019-01-15 ENCOUNTER — OFFICE VISIT (OUTPATIENT)
Dept: AUDIOLOGY | Facility: CLINIC | Age: 84
End: 2019-01-15

## 2019-01-15 DIAGNOSIS — H90.3 SENSORINEURAL HEARING LOSS, BILATERAL: Primary | ICD-10-CM

## 2019-01-15 PROCEDURE — 92593 HEARING AID CHECK, BOTH EARS: CPT | Performed by: AUDIOLOGIST

## 2019-01-15 PROCEDURE — V5299 HEARING SERVICE: HCPCS | Performed by: AUDIOLOGIST

## 2019-01-15 NOTE — PROGRESS NOTES
HEARING AID FOLLOW-UP    Om Marylu Nolan  1/29/1935  [id number]    Patient is here for  of new c-shell for right ear. Patient wears Jodeane Peeks   The patient has the following concerns:      In office the following actions were taken:  Re

## 2019-01-22 ENCOUNTER — TELEPHONE (OUTPATIENT)
Dept: AUDIOLOGY | Facility: CLINIC | Age: 84
End: 2019-01-22

## 2019-01-24 NOTE — TELEPHONE ENCOUNTER
Spoke to wife about insurance. She gave me id# and group # for [employer]. She also called their 1-800# and was told he has same coverage as he had three years ago when insurance did cover aid at 100%.     Will again seek to get benefit verification team to mary

## 2019-01-31 NOTE — TELEPHONE ENCOUNTER
Spoke with patient's wife. Benefit verification does show coverage for two hearing aids every three years and patient is currently eligible.    Patient does not want CROS style again as he has not felt it is beneficial.   Patient may be interested in two

## 2019-02-05 ENCOUNTER — HOSPITAL (OUTPATIENT)
Dept: OTHER | Age: 84
End: 2019-02-05

## 2019-02-05 ENCOUNTER — OFFICE VISIT (OUTPATIENT)
Dept: INTERNAL MEDICINE | Age: 84
End: 2019-02-05

## 2019-02-05 ENCOUNTER — IMAGING SERVICES (OUTPATIENT)
Dept: GENERAL RADIOLOGY | Age: 84
End: 2019-02-05

## 2019-02-05 VITALS
TEMPERATURE: 98.1 F | DIASTOLIC BLOOD PRESSURE: 62 MMHG | OXYGEN SATURATION: 99 % | BODY MASS INDEX: 24.07 KG/M2 | HEART RATE: 82 BPM | WEIGHT: 163 LBS | RESPIRATION RATE: 17 BRPM | SYSTOLIC BLOOD PRESSURE: 120 MMHG

## 2019-02-05 DIAGNOSIS — M54.50 ACUTE MIDLINE LOW BACK PAIN WITHOUT SCIATICA: ICD-10-CM

## 2019-02-05 DIAGNOSIS — Z98.61 CAD S/P PERCUTANEOUS CORONARY ANGIOPLASTY: ICD-10-CM

## 2019-02-05 DIAGNOSIS — I25.10 CAD S/P PERCUTANEOUS CORONARY ANGIOPLASTY: ICD-10-CM

## 2019-02-05 DIAGNOSIS — D64.9 ANEMIA, UNSPECIFIED TYPE: ICD-10-CM

## 2019-02-05 DIAGNOSIS — F41.8 DEPRESSION WITH ANXIETY: ICD-10-CM
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
## 2019-02-05 DIAGNOSIS — K21.9 CHRONIC GERD: ICD-10-CM
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
## 2019-02-05 PROCEDURE — 72170 X-RAY EXAM OF PELVIS: CPT | Performed by: EMERGENCY MEDICINE

## 2019-02-05 PROCEDURE — 99214 OFFICE O/P EST MOD 30 MIN: CPT | Performed by: INTERNAL MEDICINE

## 2019-02-05 RX ORDER — OXYBUTYNIN CHLORIDE 15 MG/1
15 TABLET, EXTENDED RELEASE ORAL
COMMUNITY

## 2019-02-05 RX ORDER — CYCLOBENZAPRINE HCL 5 MG
5 TABLET ORAL 3 TIMES DAILY PRN
Qty: 30 TABLET | Refills: 0 | Status: SHIPPED | OUTPATIENT
Start: 2019-02-05

## 2019-02-05 RX ORDER — ATORVASTATIN CALCIUM 80 MG/1
80 TABLET, FILM COATED ORAL
COMMUNITY
Start: 2015-04-08 | End: 2020-01-03 | Stop reason: SDUPTHER

## 2019-02-05 RX ORDER — PREDNISONE 20 MG/1
TABLET ORAL
COMMUNITY
Start: 2018-08-26 | End: 2019-08-26

## 2019-02-05 RX ORDER — OMEPRAZOLE 20 MG/1
20 CAPSULE, DELAYED RELEASE ORAL
COMMUNITY
End: 2020-01-03 | Stop reason: SDUPTHER

## 2019-02-05 ASSESSMENT — ENCOUNTER SYMPTOMS
EYES NEGATIVE: 1
CONSTITUTIONAL NEGATIVE: 1
ENDOCRINE NEGATIVE: 1
GASTROINTESTINAL NEGATIVE: 1
RESPIRATORY NEGATIVE: 1
NEUROLOGICAL NEGATIVE: 1
BACK PAIN: 1
NERVOUS/ANXIOUS: 1

## 2019-04-25 RX ORDER — MIRTAZAPINE 30 MG/1
TABLET, FILM COATED ORAL
Qty: 90 TABLET | Refills: 1 | Status: SHIPPED | OUTPATIENT
Start: 2019-04-25 | End: 2020-01-03 | Stop reason: SDUPTHER

## 2019-09-07 ENCOUNTER — TELEPHONE (OUTPATIENT)
Dept: SCHEDULING | Age: 84
End: 2019-09-07

## 2019-09-23 ENCOUNTER — TELEPHONE (OUTPATIENT)
Dept: SCHEDULING | Age: 84
End: 2019-09-23

## 2019-09-24 ENCOUNTER — TELEPHONE (OUTPATIENT)
Dept: OTOLARYNGOLOGY | Facility: CLINIC | Age: 84
End: 2019-09-24

## 2019-09-24 ENCOUNTER — TELEPHONE (OUTPATIENT)
Dept: SCHEDULING | Age: 84
End: 2019-09-24

## 2019-09-24 RX ORDER — HYDROCORTISONE AND ACETIC ACID 1.1; 2.41 G/100ML; G/100ML
2 SOLUTION AURICULAR (OTIC) 2 TIMES DAILY
Qty: 1 BOTTLE | Refills: 1 | Status: SHIPPED | OUTPATIENT
Start: 2019-09-24 | End: 2021-09-22 | Stop reason: ALTCHOICE

## 2019-09-24 NOTE — TELEPHONE ENCOUNTER
•  Hydrocortisone-Acetic Acid 1-2 % Otic Solution, Place 3 drops into both ears 2 (two) times daily. , Disp: 1 Bottle, Rfl: 2    Fax received from [address] [facility] [address] unable to get this medication/ asking for an alternate RX     Please advise/   •

## 2019-10-01 ENCOUNTER — TELEPHONE (OUTPATIENT)
Dept: AUDIOLOGY | Facility: CLINIC | Age: 84
End: 2019-10-01

## 2019-10-01 NOTE — TELEPHONE ENCOUNTER
Patient has appt for 10-24. He is not hearing clearly now and I am unsure if problem is a change in hearing or in the hearing aid. He also has hearing aid benefit for new hearing aid and may be interested in pursuing this.   Asked for benefit veri

## 2019-10-02 DIAGNOSIS — H90.3 SENSORINEURAL HEARING LOSS, BILATERAL: Primary | ICD-10-CM

## 2019-10-03 ENCOUNTER — TELEPHONE (OUTPATIENT)
Dept: OTOLARYNGOLOGY | Facility: CLINIC | Age: 84
End: 2019-10-03

## 2019-10-03 RX ORDER — HYDROCORTISONE AND ACETIC ACID 1.1; 2.41 G/100ML; G/100ML
3 SOLUTION AURICULAR (OTIC) 2 TIMES DAILY
Qty: 1 BOTTLE | Refills: 1 | Status: SHIPPED | OUTPATIENT
Start: 2019-10-03

## 2019-10-04 ENCOUNTER — TELEPHONE (OUTPATIENT)
Dept: SCHEDULING | Age: 84
End: 2019-10-04

## 2019-10-04 ENCOUNTER — TELEPHONE (OUTPATIENT)
Dept: OTOLARYNGOLOGY | Facility: CLINIC | Age: 84
End: 2019-10-04

## 2019-10-04 NOTE — TELEPHONE ENCOUNTER
Pt wife states the pharmacy did not receive the Rx and is requesting for prescription to be resent.  Wife also states she spoke with  and he instructed pt to call the office to be squeezed in the schedule to be seen before the pt see Gloria Montana on

## 2019-10-05 ENCOUNTER — OFFICE VISIT (OUTPATIENT)
Dept: FAMILY MEDICINE | Age: 84
End: 2019-10-05

## 2019-10-05 VITALS
SYSTOLIC BLOOD PRESSURE: 120 MMHG | TEMPERATURE: 97.7 F | OXYGEN SATURATION: 97 % | HEART RATE: 84 BPM | WEIGHT: 160.14 LBS | BODY MASS INDEX: 24.27 KG/M2 | RESPIRATION RATE: 18 BRPM | HEIGHT: 68 IN | DIASTOLIC BLOOD PRESSURE: 60 MMHG

## 2019-10-05 DIAGNOSIS — J06.9 UPPER RESPIRATORY TRACT INFECTION, UNSPECIFIED TYPE: Primary | ICD-10-CM

## 2019-10-05 PROCEDURE — 99203 OFFICE O/P NEW LOW 30 MIN: CPT | Performed by: FAMILY MEDICINE

## 2019-10-05 RX ORDER — BENZONATATE 200 MG/1
200 CAPSULE ORAL 3 TIMES DAILY PRN
Qty: 21 CAPSULE | Refills: 0 | Status: SHIPPED | OUTPATIENT
Start: 2019-10-05 | End: 2019-10-12

## 2019-10-05 SDOH — HEALTH STABILITY: MENTAL HEALTH: HOW OFTEN DO YOU HAVE A DRINK CONTAINING ALCOHOL?: NEVER

## 2019-10-05 ASSESSMENT — ENCOUNTER SYMPTOMS
TROUBLE SWALLOWING: 0
COUGH: 1
SINUS PAIN: 0
FACIAL SWELLING: 0
SORE THROAT: 0
CONSTITUTIONAL NEGATIVE: 1
RHINORRHEA: 1
SHORTNESS OF BREATH: 0
GASTROINTESTINAL NEGATIVE: 1
SINUS PRESSURE: 0
VOICE CHANGE: 0

## 2019-10-05 ASSESSMENT — COGNITIVE AND FUNCTIONAL STATUS - GENERAL
BECAUSE OF A PHYSICAL, MENTAL, OR EMOTIONAL CONDITION, DO YOU HAVE DIFFICULTY DOING ERRANDS ALONE: NO
BECAUSE OF A PHYSICAL, MENTAL, OR EMOTIONAL CONDITION, DO YOU HAVE SERIOUS DIFFICULTY CONCENTRATING, REMEMBERING OR MAKING DECISIONS: NO
DO YOU HAVE DIFFICULTY DRESSING OR BATHING: NO
DO YOU HAVE SERIOUS DIFFICULTY WALKING OR CLIMBING STAIRS: NO

## 2019-10-05 ASSESSMENT — PATIENT HEALTH QUESTIONNAIRE - PHQ9
2. FEELING DOWN, DEPRESSED OR HOPELESS: NOT AT ALL
SUM OF ALL RESPONSES TO PHQ9 QUESTIONS 1 AND 2: 0
1. LITTLE INTEREST OR PLEASURE IN DOING THINGS: NOT AT ALL
SUM OF ALL RESPONSES TO PHQ9 QUESTIONS 1 AND 2: 0

## 2019-10-07 ENCOUNTER — TELEPHONE (OUTPATIENT)
Dept: OTOLARYNGOLOGY | Facility: CLINIC | Age: 84
End: 2019-10-07

## 2019-10-07 NOTE — TELEPHONE ENCOUNTER
Current Outpatient Medications:   •  Hydrocortisone-Acetic Acid 1-2 % Otic Solution, Place 3 drops into both ears 2 (two) times daily. , Disp: 1 Bottle, Rfl: 1  •   NOTE: Per [email or web address] Medication not covered please send an alternative

## 2019-10-10 ENCOUNTER — APPOINTMENT (OUTPATIENT)
Dept: INTERNAL MEDICINE | Age: 84
End: 2019-10-10

## 2019-10-15 ENCOUNTER — APPOINTMENT (OUTPATIENT)
Dept: INTERNAL MEDICINE | Age: 84
End: 2019-10-15

## 2019-10-22 ENCOUNTER — LAB SERVICES (OUTPATIENT)
Dept: LAB | Age: 84
End: 2019-10-22

## 2019-10-22 ENCOUNTER — OFFICE VISIT (OUTPATIENT)
Dept: INTERNAL MEDICINE | Age: 84
End: 2019-10-22

## 2019-10-22 ENCOUNTER — IMAGING SERVICES (OUTPATIENT)
Dept: GENERAL RADIOLOGY | Age: 84
End: 2019-10-22

## 2019-10-22 VITALS
WEIGHT: 160.6 LBS | OXYGEN SATURATION: 97 % | BODY MASS INDEX: 24.34 KG/M2 | HEIGHT: 68 IN | RESPIRATION RATE: 18 BRPM | SYSTOLIC BLOOD PRESSURE: 126 MMHG | TEMPERATURE: 97.5 F | DIASTOLIC BLOOD PRESSURE: 64 MMHG | HEART RATE: 61 BPM

## 2019-10-22 DIAGNOSIS — I25.10 CAD S/P PERCUTANEOUS CORONARY ANGIOPLASTY: ICD-10-CM

## 2019-10-22 DIAGNOSIS — Z98.61 CAD S/P PERCUTANEOUS CORONARY ANGIOPLASTY: ICD-10-CM

## 2019-10-22 DIAGNOSIS — F41.8 DEPRESSION WITH ANXIETY: ICD-10-CM

## 2019-10-22 DIAGNOSIS — R07.9 CHEST PAIN, UNSPECIFIED TYPE: Primary | ICD-10-CM

## 2019-10-22 DIAGNOSIS — Z01.89 ROUTINE LAB DRAW: ICD-10-CM

## 2019-10-22 DIAGNOSIS — M54.50 ACUTE MIDLINE LOW BACK PAIN WITHOUT SCIATICA: ICD-10-CM

## 2019-10-22 DIAGNOSIS — R10.31 RIGHT LOWER QUADRANT ABDOMINAL PAIN: ICD-10-CM

## 2019-10-22 DIAGNOSIS — K21.9 CHRONIC GERD: ICD-10-CM

## 2019-10-22 DIAGNOSIS — D64.9 ANEMIA, UNSPECIFIED TYPE: ICD-10-CM

## 2019-10-22 LAB
APPEARANCE UR: ABNORMAL
BACTERIA #/AREA URNS HPF: ABNORMAL /HPF
BASOPHILS # BLD AUTO: 0 K/MCL (ref 0–0.3)
BASOPHILS NFR BLD AUTO: 1 %
BILIRUB UR QL STRIP: NEGATIVE
COLOR UR: YELLOW
DIFFERENTIAL METHOD BLD: ABNORMAL
EOSINOPHIL # BLD AUTO: 0 K/MCL (ref 0.1–0.5)
EOSINOPHIL NFR SPEC: 1 %
ERYTHROCYTE [DISTWIDTH] IN BLOOD: 16.6 % (ref 11–15)
GLUCOSE UR STRIP-MCNC: NEGATIVE MG/DL
HBA1C MFR BLD: 5.6 % (ref 4.5–5.6)
HCT VFR BLD CALC: 35.5 % (ref 39–51)
HGB BLD-MCNC: 10.8 G/DL (ref 13–17)
HGB UR QL STRIP: ABNORMAL
HYALINE CASTS #/AREA URNS LPF: ABNORMAL /LPF (ref 0–5)
IMM GRANULOCYTES # BLD AUTO: 0 K/MCL (ref 0–0.2)
IMM GRANULOCYTES NFR BLD: 1 %
KETONES UR STRIP-MCNC: NEGATIVE MG/DL
LEUKOCYTE ESTERASE UR QL STRIP: NEGATIVE
LYMPHOCYTES # BLD MANUAL: 0.7 K/MCL (ref 1–4)
LYMPHOCYTES NFR BLD MANUAL: 24 %
MCH RBC QN AUTO: 28.6 PG (ref 26–34)
MCHC RBC AUTO-ENTMCNC: 30.4 G/DL (ref 32–36.5)
MCV RBC AUTO: 93.9 FL (ref 78–100)
MONOCYTES # BLD MANUAL: 0.3 K/MCL (ref 0.3–0.9)
MONOCYTES NFR BLD MANUAL: 9 %
MUCOUS THREADS URNS QL MICRO: PRESENT
NEUTROPHILS # BLD: 1.9 K/MCL (ref 1.8–7.7)
NEUTROPHILS NFR BLD AUTO: 64 %
NITRITE UR QL STRIP: NEGATIVE
NRBC BLD MANUAL-RTO: 0 /100 WBC
PH UR STRIP: 5 UNITS (ref 5–7)
PLATELET # BLD: 182 K/MCL (ref 140–450)
PROT UR STRIP-MCNC: 30 MG/DL
RBC # BLD: 3.78 MIL/MCL (ref 4.5–5.9)
RBC #/AREA URNS HPF: ABNORMAL /HPF (ref 0–2)
SP GR UR STRIP: 1.02 (ref 1–1.03)
SPECIMEN SOURCE: ABNORMAL
SQUAMOUS #/AREA URNS HPF: ABNORMAL /HPF (ref 0–5)
UROBILINOGEN UR STRIP-MCNC: 0.2 MG/DL (ref 0–1)
WBC # BLD: 2.9 K/MCL (ref 4.2–11)
WBC #/AREA URNS HPF: ABNORMAL /HPF (ref 0–5)

## 2019-10-22 PROCEDURE — 71046 X-RAY EXAM CHEST 2 VIEWS: CPT | Performed by: EMERGENCY MEDICINE

## 2019-10-22 PROCEDURE — 99214 OFFICE O/P EST MOD 30 MIN: CPT | Performed by: INTERNAL MEDICINE

## 2019-10-22 RX ORDER — SUCRALFATE 1 G/1
1 TABLET ORAL 3 TIMES DAILY
Qty: 90 TABLET | Refills: 3 | Status: SHIPPED | OUTPATIENT
Start: 2019-10-22 | End: 2020-01-03 | Stop reason: SDUPTHER

## 2019-10-22 ASSESSMENT — ENCOUNTER SYMPTOMS
CONSTITUTIONAL NEGATIVE: 1
EYES NEGATIVE: 1
RESPIRATORY NEGATIVE: 1
ABDOMINAL PAIN: 1
NEUROLOGICAL NEGATIVE: 1
ENDOCRINE NEGATIVE: 1
NERVOUS/ANXIOUS: 1

## 2019-10-23 LAB
ALBUMIN SERPL-MCNC: 4 G/DL (ref 3.6–5.1)
ALBUMIN/GLOB SERPL: 1.1 {RATIO} (ref 1–2.4)
ALP SERPL-CCNC: 102 UNITS/L (ref 45–117)
ALT SERPL-CCNC: 22 UNITS/L
ANION GAP SERPL CALC-SCNC: 11 MMOL/L (ref 10–20)
AST SERPL-CCNC: 24 UNITS/L
BILIRUB SERPL-MCNC: 0.5 MG/DL (ref 0.2–1)
BUN SERPL-MCNC: 17 MG/DL (ref 6–20)
BUN/CREAT SERPL: 18 (ref 7–25)
CALCIUM SERPL-MCNC: 8.9 MG/DL (ref 8.4–10.2)
CHLORIDE SERPL-SCNC: 109 MMOL/L (ref 98–107)
CHOLEST SERPL-MCNC: 112 MG/DL
CHOLEST/HDLC SERPL: 2.7 {RATIO}
CO2 SERPL-SCNC: 24 MMOL/L (ref 21–32)
CREAT SERPL-MCNC: 0.96 MG/DL (ref 0.67–1.17)
FASTING STATUS PATIENT QL REPORTED: 12 HRS
GLOBULIN SER-MCNC: 3.5 G/DL (ref 2–4)
GLUCOSE SERPL-MCNC: 105 MG/DL (ref 65–99)
HDLC SERPL-MCNC: 41 MG/DL
LDLC SERPL-MCNC: 54 MG/DL
LENGTH OF FAST TIME PATIENT: 12 HRS
NONHDLC SERPL-MCNC: 71 MG/DL
POTASSIUM SERPL-SCNC: 4.2 MMOL/L (ref 3.4–5.1)
PROT SERPL-MCNC: 7.5 G/DL (ref 6.4–8.2)
SODIUM SERPL-SCNC: 140 MMOL/L (ref 135–145)
TRIGL SERPL-MCNC: 85 MG/DL
TSH SERPL-ACNC: 2.66 MCUNITS/ML (ref 0.35–5)
UREA BREATH TEST QL: NEGATIVE

## 2019-10-24 ENCOUNTER — OFFICE VISIT (OUTPATIENT)
Dept: AUDIOLOGY | Facility: CLINIC | Age: 84
End: 2019-10-24
Payer: MEDICARE

## 2019-10-24 ENCOUNTER — OFFICE VISIT (OUTPATIENT)
Dept: OTOLARYNGOLOGY | Facility: CLINIC | Age: 84
End: 2019-10-24
Payer: MEDICARE

## 2019-10-24 DIAGNOSIS — H90.3 SENSORINEURAL HEARING LOSS, BILATERAL: Primary | ICD-10-CM

## 2019-10-24 DIAGNOSIS — H60.8X3 CHRONIC ECZEMATOUS OTITIS EXTERNA OF BOTH EARS: Primary | ICD-10-CM

## 2019-10-24 PROCEDURE — V5257 HEARING AID, DIGIT, MON, BTE: HCPCS | Performed by: AUDIOLOGIST

## 2019-10-24 PROCEDURE — 92557 COMPREHENSIVE HEARING TEST: CPT | Performed by: AUDIOLOGIST

## 2019-10-24 PROCEDURE — 92567 TYMPANOMETRY: CPT | Performed by: AUDIOLOGIST

## 2019-10-24 RX ORDER — FLUOCINOLONE ACETONIDE 0.11 MG/ML
3 OIL AURICULAR (OTIC) 3 TIMES DAILY
Qty: 1 BOTTLE | Refills: 0 | Status: SHIPPED | OUTPATIENT
Start: 2019-10-24 | End: 2019-10-31

## 2019-10-24 NOTE — PROGRESS NOTES
AUDIOLOGY REPORT      Om KEON Sterling Omer is a 82year old male     Referring Provider: Philipp Chambers   YOB: 1935  Medical Record: [id number]      Patient Hearing History:  Patient was seen today for hearing testing.          Otoscopic Inspection: patient:  Insertion/removal of hearing aid into ear  Insertion/removal of battery  Adjustment of volume  Changing programs  Function and operation of controls  Telephone use  Cleaning of hearing aid/earmold  Storage of hearing aids  Use of accessories  Rem

## 2019-10-24 NOTE — PROGRESS NOTES
Anne Reveles is a 82year old male.  Patient presents with:  Ear Problem: difficulty hearing     HPI:   He has been having some itching in his ears but otherwise seems to be doing well  Fluocinolone Acetonide 0.01 % Otic Oil, Place 3 drops in ear(s) 3 (thr septum - Normal, Turbinates - Normal   Neurological Normal Memory - Normal. Cranial nerves - Cranial nerves II through XII grossly intact.    Neck Exam Normal Inspection - Normal. Palpation - Normal. Parotid gland - Normal. Thyroid gland - Normal.   Psychia

## 2019-10-29 ENCOUNTER — TELEPHONE (OUTPATIENT)
Dept: SCHEDULING | Age: 84
End: 2019-10-29

## 2019-10-31 DIAGNOSIS — N39.0 FREQUENT UTI: Primary | ICD-10-CM

## 2019-11-04 ENCOUNTER — HOSPITAL (OUTPATIENT)
Dept: OTHER | Age: 84
End: 2019-11-04
Attending: INTERNAL MEDICINE

## 2019-11-04 ENCOUNTER — TELEPHONE (OUTPATIENT)
Dept: SCHEDULING | Age: 84
End: 2019-11-04

## 2019-11-04 ENCOUNTER — TELEPHONE (OUTPATIENT)
Dept: AUDIOLOGY | Facility: CLINIC | Age: 84
End: 2019-11-04

## 2019-11-05 ENCOUNTER — OFFICE VISIT (OUTPATIENT)
Dept: FAMILY MEDICINE | Age: 84
End: 2019-11-05

## 2019-11-05 VITALS — TEMPERATURE: 98.4 F

## 2019-11-05 DIAGNOSIS — Z23 NEED FOR INFLUENZA VACCINATION: Primary | ICD-10-CM

## 2019-11-05 PROCEDURE — G0008 ADMIN INFLUENZA VIRUS VAC: HCPCS

## 2019-11-05 PROCEDURE — 90662 IIV NO PRSV INCREASED AG IM: CPT

## 2019-11-05 NOTE — TELEPHONE ENCOUNTER
Spoke with patient's wife and made appt for Nov 12 at 12noon for  of repair and new ELIECER/cshell for right. Will also exchange rechargeable aid dispensed at last visit for battery ELIECER.

## 2019-11-08 ENCOUNTER — TELEPHONE (OUTPATIENT)
Dept: FAMILY MEDICINE | Age: 84
End: 2019-11-08

## 2019-11-09 ENCOUNTER — TELEPHONE (OUTPATIENT)
Dept: SCHEDULING | Age: 84
End: 2019-11-09

## 2019-11-11 ENCOUNTER — OFFICE VISIT (OUTPATIENT)
Dept: INTERNAL MEDICINE | Age: 84
End: 2019-11-11

## 2019-11-11 VITALS
HEART RATE: 57 BPM | HEIGHT: 68 IN | WEIGHT: 160 LBS | DIASTOLIC BLOOD PRESSURE: 80 MMHG | OXYGEN SATURATION: 98 % | RESPIRATION RATE: 17 BRPM | BODY MASS INDEX: 24.25 KG/M2 | TEMPERATURE: 98.3 F | SYSTOLIC BLOOD PRESSURE: 124 MMHG

## 2019-11-11 DIAGNOSIS — Z23 NEED FOR PNEUMOCOCCAL VACCINATION: Primary | ICD-10-CM

## 2019-11-11 DIAGNOSIS — K43.9 HERNIA OF ABDOMINAL WALL: ICD-10-CM

## 2019-11-11 DIAGNOSIS — N21.0 BLADDER STONES: ICD-10-CM

## 2019-11-11 DIAGNOSIS — R31.29 HEMATURIA, MICROSCOPIC: ICD-10-CM

## 2019-11-11 DIAGNOSIS — F41.8 DEPRESSION WITH ANXIETY: ICD-10-CM

## 2019-11-11 DIAGNOSIS — N28.1 KIDNEY CYSTS: ICD-10-CM

## 2019-11-11 PROCEDURE — 99214 OFFICE O/P EST MOD 30 MIN: CPT | Performed by: INTERNAL MEDICINE

## 2019-11-11 ASSESSMENT — ENCOUNTER SYMPTOMS: AGITATION: 1

## 2019-11-12 NOTE — TELEPHONE ENCOUNTER
Spoke with patient. Rechargeable aid was not working. They were working on getting a rental car to still come today. Rescheduled for November 19th at 12 noon. Also talked them through how to re-set hearing aid. It started working again.

## 2019-11-19 ENCOUNTER — TELEPHONE (OUTPATIENT)
Dept: OTOLARYNGOLOGY | Facility: CLINIC | Age: 84
End: 2019-11-19

## 2019-11-19 ENCOUNTER — OFFICE VISIT (OUTPATIENT)
Dept: AUDIOLOGY | Facility: CLINIC | Age: 84
End: 2019-11-19
Payer: MEDICARE

## 2019-11-19 DIAGNOSIS — H90.3 SENSORINEURAL HEARING LOSS, BILATERAL: Primary | ICD-10-CM

## 2019-11-19 PROCEDURE — V5257 HEARING AID, DIGIT, MON, BTE: HCPCS | Performed by: AUDIOLOGIST

## 2019-11-19 RX ORDER — FLUOCINOLONE ACETONIDE 0.11 MG/ML
3 OIL AURICULAR (OTIC) 3 TIMES DAILY
Qty: 1 BOTTLE | Refills: 0 | Status: SHIPPED | OUTPATIENT
Start: 2019-11-19 | End: 2019-11-26

## 2019-11-19 NOTE — PROGRESS NOTES
Hearing Aid Fitting    [address] [facility] one hearing aid. The hearing aid fitting was completed by Lucina French       Hearing Aid Information    Otis Peralta   Right #[id number]  #2 Janet Ellsworth c-shell for right #[id number] is experienced user. Reviewed use of VC in office. Discussed realistic expectations with one ear and his degree of hearing loss. No follow up scheduled at this time. Patient will contact office with any problems.        11/19/19  RAMON Rebolledo

## 2019-11-21 PROCEDURE — G0009 ADMIN PNEUMOCOCCAL VACCINE: HCPCS

## 2019-11-21 PROCEDURE — 90670 PCV13 VACCINE IM: CPT

## 2019-12-27 ENCOUNTER — TELEPHONE (OUTPATIENT)
Dept: AUDIOLOGY | Facility: CLINIC | Age: 84
End: 2019-12-27

## 2019-12-27 NOTE — TELEPHONE ENCOUNTER
Left message on voice mail. Advised they check wax guards and batteries. If problem continues they may need to bring hearing aids in. (He has three hearing aids for right ear so hard to imagine they are ALL malfunctioning at same time).

## 2019-12-30 NOTE — TELEPHONE ENCOUNTER
Patient called again. He changed batteries and wax filters, but aid is still not working. Would like to come in to see Laurel. I offered him her next available on Jan. 9th, but they would like to come in sooner as they are going [place] on a trip soon. I told him Tacholeolaosmany Clinton will call when she is back in the office on Thursday.

## 2020-01-03 ENCOUNTER — OFFICE VISIT (OUTPATIENT)
Dept: INTERNAL MEDICINE | Age: 85
End: 2020-01-03

## 2020-01-03 ENCOUNTER — IMAGING SERVICES (OUTPATIENT)
Dept: GENERAL RADIOLOGY | Age: 85
End: 2020-01-03

## 2020-01-03 ENCOUNTER — OFFICE VISIT (OUTPATIENT)
Dept: AUDIOLOGY | Facility: CLINIC | Age: 85
End: 2020-01-03
Payer: MEDICARE

## 2020-01-03 VITALS
RESPIRATION RATE: 18 BRPM | DIASTOLIC BLOOD PRESSURE: 68 MMHG | BODY MASS INDEX: 23.49 KG/M2 | HEART RATE: 64 BPM | WEIGHT: 155 LBS | OXYGEN SATURATION: 95 % | SYSTOLIC BLOOD PRESSURE: 114 MMHG | HEIGHT: 68 IN | TEMPERATURE: 98.6 F

## 2020-01-03 DIAGNOSIS — F41.8 DEPRESSION WITH ANXIETY: ICD-10-CM

## 2020-01-03 DIAGNOSIS — M54.50 ACUTE MIDLINE LOW BACK PAIN WITHOUT SCIATICA: ICD-10-CM

## 2020-01-03 DIAGNOSIS — H90.3 SENSORINEURAL HEARING LOSS, BILATERAL: Primary | ICD-10-CM

## 2020-01-03 DIAGNOSIS — M54.50 ACUTE MIDLINE LOW BACK PAIN WITHOUT SCIATICA: Primary | ICD-10-CM

## 2020-01-03 DIAGNOSIS — K21.9 CHRONIC GERD: ICD-10-CM

## 2020-01-03 PROCEDURE — 92593 HEARING AID CHECK, BOTH EARS: CPT | Performed by: AUDIOLOGIST

## 2020-01-03 PROCEDURE — 72100 X-RAY EXAM L-S SPINE 2/3 VWS: CPT | Performed by: EMERGENCY MEDICINE

## 2020-01-03 PROCEDURE — 99214 OFFICE O/P EST MOD 30 MIN: CPT | Performed by: INTERNAL MEDICINE

## 2020-01-03 RX ORDER — OMEPRAZOLE 20 MG/1
20 CAPSULE, DELAYED RELEASE ORAL DAILY
Qty: 90 CAPSULE | Refills: 3 | Status: SHIPPED | OUTPATIENT
Start: 2020-01-03

## 2020-01-03 RX ORDER — ATORVASTATIN CALCIUM 80 MG/1
80 TABLET, FILM COATED ORAL DAILY
Qty: 90 TABLET | Refills: 3 | Status: SHIPPED | OUTPATIENT
Start: 2020-01-03

## 2020-01-03 RX ORDER — MIRTAZAPINE 30 MG/1
30 TABLET, FILM COATED ORAL EVERY EVENING
Qty: 90 TABLET | Refills: 3 | Status: SHIPPED | OUTPATIENT
Start: 2020-01-03 | End: 2020-07-10 | Stop reason: SDUPTHER

## 2020-01-03 RX ORDER — SUCRALFATE 1 G/1
1 TABLET ORAL 3 TIMES DAILY
Qty: 90 TABLET | Refills: 3 | Status: SHIPPED | OUTPATIENT
Start: 2020-01-03

## 2020-01-03 ASSESSMENT — PATIENT HEALTH QUESTIONNAIRE - PHQ9
SUM OF ALL RESPONSES TO PHQ9 QUESTIONS 1 AND 2: 0
1. LITTLE INTEREST OR PLEASURE IN DOING THINGS: NOT AT ALL
SUM OF ALL RESPONSES TO PHQ9 QUESTIONS 1 AND 2: 0
2. FEELING DOWN, DEPRESSED OR HOPELESS: NOT AT ALL

## 2020-01-03 ASSESSMENT — ENCOUNTER SYMPTOMS
NEUROLOGICAL NEGATIVE: 1
CONSTITUTIONAL NEGATIVE: 1
ENDOCRINE NEGATIVE: 1
GASTROINTESTINAL NEGATIVE: 1
EYES NEGATIVE: 1
BACK PAIN: 1
RESPIRATORY NEGATIVE: 1
AGITATION: 1

## 2020-01-03 NOTE — PROGRESS NOTES
HEARING AID FOLLOW-UP    Om Saintclair Arrant  1/29/1935  [id number]    Patient is here for problem with aid.     Hearing Aid Information  Amy Merino   Right #[id number]  #2 M reciever   Custom c-shell for right #[id number]  Dispensing date:  66-76-50

## 2020-01-09 RX ORDER — MIRTAZAPINE 30 MG/1
TABLET, FILM COATED ORAL
Qty: 90 TABLET | Refills: 0 | Status: SHIPPED | OUTPATIENT
Start: 2020-01-09 | End: 2020-07-10 | Stop reason: SDUPTHER

## 2020-01-14 NOTE — TELEPHONE ENCOUNTER
Patient dropped off right hearing aid. Wire detached from [employer]. Needs new Cshell. Hearing aid works fine  Called patient and [facility] that charge would be $125.00 with three month warranty. Advised to call us back to authorize repair.
No

## 2020-01-17 ENCOUNTER — OFFICE VISIT (OUTPATIENT)
Dept: AUDIOLOGY | Facility: CLINIC | Age: 85
End: 2020-01-17
Payer: MEDICARE

## 2020-01-17 DIAGNOSIS — H90.3 SENSORINEURAL HEARING LOSS, BILATERAL: Primary | ICD-10-CM

## 2020-01-17 PROCEDURE — 92593 HEARING AID CHECK, BOTH EARS: CPT | Performed by: AUDIOLOGIST

## 2020-01-17 NOTE — PROGRESS NOTES
Governor Deal is a 82year old male     No ref. provider found   1/29/1935  [id number]    Patient is here for  of hearing aid repair.       Hearing Aid Information     Jarred Giron   Right #[id number]  #2 Steve Rodriguez

## 2020-07-01 ENCOUNTER — TELEPHONE (OUTPATIENT)
Dept: SCHEDULING | Age: 85
End: 2020-07-01

## 2020-07-10 ENCOUNTER — LAB SERVICES (OUTPATIENT)
Dept: LAB | Age: 85
End: 2020-07-10

## 2020-07-10 ENCOUNTER — OFFICE VISIT (OUTPATIENT)
Dept: INTERNAL MEDICINE | Age: 85
End: 2020-07-10

## 2020-07-10 VITALS
BODY MASS INDEX: 20.92 KG/M2 | SYSTOLIC BLOOD PRESSURE: 120 MMHG | OXYGEN SATURATION: 98 % | WEIGHT: 138 LBS | HEART RATE: 52 BPM | RESPIRATION RATE: 17 BRPM | TEMPERATURE: 97.7 F | DIASTOLIC BLOOD PRESSURE: 80 MMHG | HEIGHT: 68 IN

## 2020-07-10 DIAGNOSIS — K21.9 CHRONIC GERD: ICD-10-CM

## 2020-07-10 DIAGNOSIS — R63.4 WEIGHT LOSS: ICD-10-CM

## 2020-07-10 DIAGNOSIS — D64.9 ANEMIA, UNSPECIFIED TYPE: ICD-10-CM

## 2020-07-10 DIAGNOSIS — Z98.61 CAD S/P PERCUTANEOUS CORONARY ANGIOPLASTY: ICD-10-CM

## 2020-07-10 DIAGNOSIS — F41.8 DEPRESSION WITH ANXIETY: Primary | ICD-10-CM

## 2020-07-10 DIAGNOSIS — R10.31 RIGHT LOWER QUADRANT ABDOMINAL PAIN: ICD-10-CM

## 2020-07-10 DIAGNOSIS — I25.10 CAD S/P PERCUTANEOUS CORONARY ANGIOPLASTY: ICD-10-CM

## 2020-07-10 LAB
ALBUMIN SERPL-MCNC: 3.8 G/DL (ref 3.6–5.1)
ALBUMIN/GLOB SERPL: 1.2 {RATIO} (ref 1–2.4)
ALP SERPL-CCNC: 85 UNITS/L (ref 45–117)
ALT SERPL-CCNC: 31 UNITS/L
ANION GAP SERPL CALC-SCNC: 12 MMOL/L (ref 10–20)
APPEARANCE UR: CLEAR
AST SERPL-CCNC: 34 UNITS/L
BACTERIA #/AREA URNS HPF: ABNORMAL /HPF
BASOPHILS # BLD: 0 K/MCL (ref 0–0.3)
BASOPHILS NFR BLD: 0 %
BILIRUB SERPL-MCNC: 0.4 MG/DL (ref 0.2–1)
BILIRUB UR QL STRIP: NEGATIVE
BUN SERPL-MCNC: 19 MG/DL (ref 6–20)
BUN/CREAT SERPL: 25 (ref 7–25)
CALCIUM SERPL-MCNC: 9 MG/DL (ref 8.4–10.2)
CAOX CRY URNS QL MICRO: PRESENT
CHLORIDE SERPL-SCNC: 109 MMOL/L (ref 98–107)
CHOLEST SERPL-MCNC: 106 MG/DL
CHOLEST/HDLC SERPL: 2.5 {RATIO}
CO2 SERPL-SCNC: 25 MMOL/L (ref 21–32)
COLOR UR: YELLOW
CREAT SERPL-MCNC: 0.76 MG/DL (ref 0.67–1.17)
DIFFERENTIAL METHOD BLD: ABNORMAL
EOSINOPHIL # BLD: 0 K/MCL (ref 0.1–0.5)
EOSINOPHIL NFR BLD: 1 %
ERYTHROCYTE [DISTWIDTH] IN BLOOD: 17.1 % (ref 11–15)
GLOBULIN SER-MCNC: 3.3 G/DL (ref 2–4)
GLUCOSE SERPL-MCNC: 90 MG/DL (ref 65–99)
GLUCOSE UR STRIP-MCNC: NEGATIVE MG/DL
HCT VFR BLD CALC: 33.3 % (ref 39–51)
HDLC SERPL-MCNC: 42 MG/DL
HGB BLD-MCNC: 10.5 G/DL (ref 13–17)
HGB UR QL STRIP: ABNORMAL
HYALINE CASTS #/AREA URNS LPF: ABNORMAL /LPF (ref 0–5)
IMM GRANULOCYTES # BLD AUTO: 0 K/MCL (ref 0–0.2)
IMM GRANULOCYTES NFR BLD: 0 %
KETONES UR STRIP-MCNC: NEGATIVE MG/DL
LDLC SERPL CALC-MCNC: 50 MG/DL
LENGTH OF FAST TIME PATIENT: 10 HRS
LENGTH OF FAST TIME PATIENT: 10 HRS
LEUKOCYTE ESTERASE UR QL STRIP: NEGATIVE
LYMPHOCYTES # BLD: 0.6 K/MCL (ref 1–4)
LYMPHOCYTES NFR BLD: 17 %
MCH RBC QN AUTO: 29.4 PG (ref 26–34)
MCHC RBC AUTO-ENTMCNC: 31.5 G/DL (ref 32–36.5)
MCV RBC AUTO: 93.3 FL (ref 78–100)
MONOCYTES # BLD: 0.2 K/MCL (ref 0.3–0.9)
MONOCYTES NFR BLD: 6 %
MUCOUS THREADS URNS QL MICRO: PRESENT
NEUTROPHILS # BLD: 2.7 K/MCL (ref 1.8–7.7)
NEUTROPHILS NFR BLD: 76 %
NITRITE UR QL STRIP: NEGATIVE
NONHDLC SERPL-MCNC: 64 MG/DL
NRBC BLD MANUAL-RTO: 0 /100 WBC
PH UR STRIP: 6 UNITS (ref 5–7)
PLATELET # BLD: 172 K/MCL (ref 140–450)
POTASSIUM SERPL-SCNC: 4.2 MMOL/L (ref 3.4–5.1)
PROT SERPL-MCNC: 7.1 G/DL (ref 6.4–8.2)
PROT UR STRIP-MCNC: 30 MG/DL
RBC # BLD: 3.57 MIL/MCL (ref 4.5–5.9)
RBC #/AREA URNS HPF: ABNORMAL /HPF (ref 0–2)
SODIUM SERPL-SCNC: 142 MMOL/L (ref 135–145)
SP GR UR STRIP: 1.01 (ref 1–1.03)
SPECIMEN SOURCE: ABNORMAL
SQUAMOUS #/AREA URNS HPF: ABNORMAL /HPF (ref 0–5)
TRIGL SERPL-MCNC: 68 MG/DL
TSH SERPL-ACNC: 2.52 MCUNITS/ML (ref 0.35–5)
UROBILINOGEN UR STRIP-MCNC: 0.2 MG/DL (ref 0–1)
WBC # BLD: 3.6 K/MCL (ref 4.2–11)
WBC #/AREA URNS HPF: ABNORMAL /HPF (ref 0–5)

## 2020-07-10 PROCEDURE — 99214 OFFICE O/P EST MOD 30 MIN: CPT | Performed by: INTERNAL MEDICINE

## 2020-07-10 RX ORDER — MIRTAZAPINE 30 MG/1
30 TABLET, FILM COATED ORAL EVERY EVENING
Qty: 90 TABLET | Refills: 0 | Status: SHIPPED | OUTPATIENT
Start: 2020-07-10 | End: 2020-12-01

## 2020-07-10 RX ORDER — ALPRAZOLAM 0.25 MG/1
0.25 TABLET ORAL 3 TIMES DAILY
Qty: 60 TABLET | Refills: 0 | Status: SHIPPED | OUTPATIENT
Start: 2020-07-10 | End: 2020-08-22

## 2020-07-10 ASSESSMENT — ENCOUNTER SYMPTOMS
ENDOCRINE NEGATIVE: 1
NERVOUS/ANXIOUS: 1
RESPIRATORY NEGATIVE: 1
BACK PAIN: 1
CONSTIPATION: 1
CONSTITUTIONAL NEGATIVE: 1
NEUROLOGICAL NEGATIVE: 1
EYES NEGATIVE: 1

## 2020-07-12 LAB
BACTERIA UR CULT: NORMAL
REPORT STATUS (RPT): NORMAL
SPECIMEN SOURCE: NORMAL

## 2020-07-13 ENCOUNTER — TELEPHONE (OUTPATIENT)
Dept: SCHEDULING | Age: 85
End: 2020-07-13

## 2020-08-22 RX ORDER — ALPRAZOLAM 0.25 MG/1
TABLET ORAL
Qty: 60 TABLET | Refills: 0 | Status: SHIPPED | OUTPATIENT
Start: 2020-08-22 | End: 2020-10-27 | Stop reason: SDUPTHER

## 2020-09-21 ENCOUNTER — TELEPHONE (OUTPATIENT)
Dept: SCHEDULING | Age: 85
End: 2020-09-21

## 2020-10-01 ENCOUNTER — TELEPHONE (OUTPATIENT)
Dept: SCHEDULING | Age: 85
End: 2020-10-01

## 2020-10-13 ENCOUNTER — IMMUNIZATION (OUTPATIENT)
Dept: FAMILY MEDICINE | Age: 85
End: 2020-10-13

## 2020-10-13 VITALS — TEMPERATURE: 97.7 F

## 2020-10-13 DIAGNOSIS — Z23 NEED FOR PROPHYLACTIC VACCINATION AGAINST STREPTOCOCCUS PNEUMONIAE (PNEUMOCOCCUS) AND INFLUENZA: Primary | ICD-10-CM

## 2020-10-13 PROCEDURE — G0009 ADMIN PNEUMOCOCCAL VACCINE: HCPCS

## 2020-10-13 PROCEDURE — 90732 PPSV23 VACC 2 YRS+ SUBQ/IM: CPT

## 2020-10-13 PROCEDURE — 90662 IIV NO PRSV INCREASED AG IM: CPT

## 2020-10-13 PROCEDURE — G0008 ADMIN INFLUENZA VIRUS VAC: HCPCS

## 2020-10-15 ENCOUNTER — TELEPHONE (OUTPATIENT)
Dept: SCHEDULING | Age: 85
End: 2020-10-15

## 2020-10-19 ENCOUNTER — APPOINTMENT (OUTPATIENT)
Dept: INTERNAL MEDICINE | Age: 85
End: 2020-10-19

## 2020-10-27 ENCOUNTER — OFFICE VISIT (OUTPATIENT)
Dept: INTERNAL MEDICINE | Age: 85
End: 2020-10-27

## 2020-10-27 DIAGNOSIS — N21.0 BLADDER STONES: ICD-10-CM

## 2020-10-27 DIAGNOSIS — F41.8 DEPRESSION WITH ANXIETY: ICD-10-CM

## 2020-10-27 DIAGNOSIS — Z98.61 CAD S/P PERCUTANEOUS CORONARY ANGIOPLASTY: ICD-10-CM

## 2020-10-27 DIAGNOSIS — K40.90 NON-RECURRENT UNILATERAL INGUINAL HERNIA WITHOUT OBSTRUCTION OR GANGRENE: Primary | ICD-10-CM

## 2020-10-27 DIAGNOSIS — D64.9 ANEMIA, UNSPECIFIED TYPE: ICD-10-CM

## 2020-10-27 DIAGNOSIS — I25.10 CAD S/P PERCUTANEOUS CORONARY ANGIOPLASTY: ICD-10-CM

## 2020-10-27 DIAGNOSIS — N28.1 KIDNEY CYSTS: ICD-10-CM

## 2020-10-27 DIAGNOSIS — R31.29 HEMATURIA, MICROSCOPIC: ICD-10-CM

## 2020-10-27 PROCEDURE — 99214 OFFICE O/P EST MOD 30 MIN: CPT | Performed by: INTERNAL MEDICINE

## 2020-10-27 RX ORDER — ALPRAZOLAM 0.25 MG/1
0.25 TABLET ORAL 3 TIMES DAILY
Qty: 60 TABLET | Refills: 0 | Status: SHIPPED | OUTPATIENT
Start: 2020-10-27 | End: 2020-12-29

## 2020-10-27 ASSESSMENT — ENCOUNTER SYMPTOMS
RESPIRATORY NEGATIVE: 1
ENDOCRINE NEGATIVE: 1
NEUROLOGICAL NEGATIVE: 1
EYES NEGATIVE: 1
BACK PAIN: 1
AGITATION: 1
CONSTITUTIONAL NEGATIVE: 1
ABDOMINAL PAIN: 1

## 2020-10-27 ASSESSMENT — PATIENT HEALTH QUESTIONNAIRE - PHQ9
2. FEELING DOWN, DEPRESSED OR HOPELESS: NOT AT ALL
SUM OF ALL RESPONSES TO PHQ9 QUESTIONS 1 AND 2: 0
SUM OF ALL RESPONSES TO PHQ9 QUESTIONS 1 AND 2: 0
CLINICAL INTERPRETATION OF PHQ2 SCORE: NO FURTHER SCREENING NEEDED
CLINICAL INTERPRETATION OF PHQ9 SCORE: NO FURTHER SCREENING NEEDED
1. LITTLE INTEREST OR PLEASURE IN DOING THINGS: NOT AT ALL

## 2020-10-27 ASSESSMENT — COGNITIVE AND FUNCTIONAL STATUS - GENERAL
DO YOU HAVE SERIOUS DIFFICULTY WALKING OR CLIMBING STAIRS: NO
BECAUSE OF A PHYSICAL, MENTAL, OR EMOTIONAL CONDITION, DO YOU HAVE DIFFICULTY DOING ERRANDS ALONE: YES
BECAUSE OF A PHYSICAL, MENTAL, OR EMOTIONAL CONDITION, DO YOU HAVE SERIOUS DIFFICULTY CONCENTRATING, REMEMBERING OR MAKING DECISIONS: NO
DO YOU HAVE DIFFICULTY DRESSING OR BATHING: YES

## 2020-10-27 ASSESSMENT — PAIN SCALES - GENERAL: PAINLEVEL: 0

## 2020-11-10 ENCOUNTER — TELEPHONE (OUTPATIENT)
Dept: SCHEDULING | Age: 85
End: 2020-11-10

## 2020-11-10 DIAGNOSIS — Z01.818 PREOP EXAMINATION: Primary | ICD-10-CM

## 2020-11-10 DIAGNOSIS — I25.10 CAD S/P PERCUTANEOUS CORONARY ANGIOPLASTY: ICD-10-CM

## 2020-11-10 DIAGNOSIS — K40.90 NON-RECURRENT UNILATERAL INGUINAL HERNIA WITHOUT OBSTRUCTION OR GANGRENE: ICD-10-CM

## 2020-11-10 DIAGNOSIS — Z98.61 CAD S/P PERCUTANEOUS CORONARY ANGIOPLASTY: ICD-10-CM

## 2020-11-17 ENCOUNTER — LAB SERVICES (OUTPATIENT)
Dept: LAB | Age: 85
End: 2020-11-17

## 2020-11-17 ENCOUNTER — OFFICE VISIT (OUTPATIENT)
Dept: INTERNAL MEDICINE | Age: 85
End: 2020-11-17

## 2020-11-17 DIAGNOSIS — D64.9 ANEMIA, UNSPECIFIED TYPE: ICD-10-CM

## 2020-11-17 DIAGNOSIS — Z01.818 PREOPERATIVE CLEARANCE: Primary | ICD-10-CM

## 2020-11-17 DIAGNOSIS — K40.90 NON-RECURRENT UNILATERAL INGUINAL HERNIA WITHOUT OBSTRUCTION OR GANGRENE: ICD-10-CM

## 2020-11-17 DIAGNOSIS — F41.8 DEPRESSION WITH ANXIETY: ICD-10-CM

## 2020-11-17 DIAGNOSIS — Z01.818 PREOPERATIVE CLEARANCE: ICD-10-CM

## 2020-11-17 DIAGNOSIS — Z98.61 CAD S/P PERCUTANEOUS CORONARY ANGIOPLASTY: ICD-10-CM

## 2020-11-17 DIAGNOSIS — I25.10 CAD S/P PERCUTANEOUS CORONARY ANGIOPLASTY: ICD-10-CM

## 2020-11-17 LAB
ALBUMIN SERPL-MCNC: 4 G/DL (ref 3.6–5.1)
ALBUMIN/GLOB SERPL: 1.1 {RATIO} (ref 1–2.4)
ALP SERPL-CCNC: 96 UNITS/L (ref 45–117)
ALT SERPL-CCNC: 24 UNITS/L
ANION GAP SERPL CALC-SCNC: 12 MMOL/L (ref 10–20)
APPEARANCE UR: CLEAR
AST SERPL-CCNC: 23 UNITS/L
BASOPHILS # BLD: 0 K/MCL (ref 0–0.3)
BASOPHILS NFR BLD: 1 %
BILIRUB SERPL-MCNC: 0.5 MG/DL (ref 0.2–1)
BILIRUB UR QL STRIP: NEGATIVE
BUN SERPL-MCNC: 18 MG/DL (ref 6–20)
BUN/CREAT SERPL: 20 (ref 7–25)
CALCIUM SERPL-MCNC: 9.3 MG/DL (ref 8.4–10.2)
CHLORIDE SERPL-SCNC: 107 MMOL/L (ref 98–107)
CO2 SERPL-SCNC: 26 MMOL/L (ref 21–32)
COLOR UR: ABNORMAL
CREAT SERPL-MCNC: 0.91 MG/DL (ref 0.67–1.17)
DIFFERENTIAL METHOD BLD: ABNORMAL
EOSINOPHIL # BLD: 0.1 K/MCL (ref 0.1–0.5)
EOSINOPHIL NFR BLD: 2 %
ERYTHROCYTE [DISTWIDTH] IN BLOOD: 17.3 % (ref 11–15)
GLOBULIN SER-MCNC: 3.8 G/DL (ref 2–4)
GLUCOSE SERPL-MCNC: 98 MG/DL (ref 65–99)
GLUCOSE UR STRIP-MCNC: NEGATIVE MG/DL
HCT VFR BLD CALC: 33.4 % (ref 39–51)
HGB BLD-MCNC: 9.8 G/DL (ref 13–17)
HGB UR QL STRIP: NEGATIVE
IMM GRANULOCYTES # BLD AUTO: 0 K/MCL (ref 0–0.2)
IMM GRANULOCYTES NFR BLD: 0 %
KETONES UR STRIP-MCNC: NEGATIVE MG/DL
LENGTH OF FAST TIME PATIENT: 0 HRS
LEUKOCYTE ESTERASE UR QL STRIP: NEGATIVE
LYMPHOCYTES # BLD: 0.7 K/MCL (ref 1–4)
LYMPHOCYTES NFR BLD: 23 %
MCH RBC QN AUTO: 25.7 PG (ref 26–34)
MCHC RBC AUTO-ENTMCNC: 29.3 G/DL (ref 32–36.5)
MCV RBC AUTO: 87.4 FL (ref 78–100)
MONOCYTES # BLD: 0.2 K/MCL (ref 0.3–0.9)
MONOCYTES NFR BLD: 8 %
NEUTROPHILS # BLD: 2 K/MCL (ref 1.8–7.7)
NEUTROPHILS NFR BLD: 66 %
NITRITE UR QL STRIP: NEGATIVE
NRBC BLD MANUAL-RTO: 0 /100 WBC
PH UR STRIP: 6 UNITS (ref 5–7)
PLATELET # BLD: 187 K/MCL (ref 140–450)
POTASSIUM SERPL-SCNC: 4.6 MMOL/L (ref 3.4–5.1)
PROT SERPL-MCNC: 7.8 G/DL (ref 6.4–8.2)
PROT UR STRIP-MCNC: NEGATIVE MG/DL
RBC # BLD: 3.82 MIL/MCL (ref 4.5–5.9)
SODIUM SERPL-SCNC: 140 MMOL/L (ref 135–145)
SP GR UR STRIP: 1.01 (ref 1–1.03)
SPECIMEN SOURCE: ABNORMAL
UROBILINOGEN UR STRIP-MCNC: 0.2 MG/DL (ref 0–1)
WBC # BLD: 3.1 K/MCL (ref 4.2–11)

## 2020-11-17 PROCEDURE — 99214 OFFICE O/P EST MOD 30 MIN: CPT | Performed by: INTERNAL MEDICINE

## 2020-11-17 PROCEDURE — 93000 ELECTROCARDIOGRAM COMPLETE: CPT | Performed by: INTERNAL MEDICINE

## 2020-11-17 ASSESSMENT — ENCOUNTER SYMPTOMS
ENDOCRINE NEGATIVE: 1
RESPIRATORY NEGATIVE: 1
ABDOMINAL PAIN: 1
NERVOUS/ANXIOUS: 1
CONSTITUTIONAL NEGATIVE: 1
EYES NEGATIVE: 1

## 2020-11-17 ASSESSMENT — PAIN SCALES - GENERAL: PAINLEVEL: 0

## 2020-12-01 RX ORDER — MIRTAZAPINE 30 MG/1
TABLET, FILM COATED ORAL
Qty: 90 TABLET | Refills: 0 | Status: SHIPPED | OUTPATIENT
Start: 2020-12-01 | End: 2021-07-15

## 2020-12-09 ENCOUNTER — TELEPHONE (OUTPATIENT)
Dept: SCHEDULING | Age: 85
End: 2020-12-09

## 2020-12-17 ENCOUNTER — TELEPHONE (OUTPATIENT)
Dept: SCHEDULING | Age: 85
End: 2020-12-17

## 2020-12-21 ENCOUNTER — OFFICE VISIT (OUTPATIENT)
Dept: INTERNAL MEDICINE | Age: 85
End: 2020-12-21

## 2020-12-21 ENCOUNTER — LAB SERVICES (OUTPATIENT)
Dept: LAB | Age: 85
End: 2020-12-21

## 2020-12-21 VITALS
RESPIRATION RATE: 18 BRPM | TEMPERATURE: 97.5 F | WEIGHT: 140.3 LBS | SYSTOLIC BLOOD PRESSURE: 130 MMHG | HEIGHT: 68 IN | DIASTOLIC BLOOD PRESSURE: 70 MMHG | OXYGEN SATURATION: 96 % | BODY MASS INDEX: 21.26 KG/M2 | HEART RATE: 60 BPM

## 2020-12-21 DIAGNOSIS — I25.10 CAD S/P PERCUTANEOUS CORONARY ANGIOPLASTY: ICD-10-CM

## 2020-12-21 DIAGNOSIS — D64.9 ANEMIA, UNSPECIFIED TYPE: ICD-10-CM

## 2020-12-21 DIAGNOSIS — K40.90 NON-RECURRENT UNILATERAL INGUINAL HERNIA WITHOUT OBSTRUCTION OR GANGRENE: ICD-10-CM

## 2020-12-21 DIAGNOSIS — R10.31 RIGHT LOWER QUADRANT ABDOMINAL PAIN: ICD-10-CM

## 2020-12-21 DIAGNOSIS — Z98.61 CAD S/P PERCUTANEOUS CORONARY ANGIOPLASTY: ICD-10-CM

## 2020-12-21 DIAGNOSIS — D64.9 ANEMIA, UNSPECIFIED TYPE: Primary | ICD-10-CM

## 2020-12-21 PROCEDURE — 99214 OFFICE O/P EST MOD 30 MIN: CPT | Performed by: INTERNAL MEDICINE

## 2020-12-21 PROCEDURE — 36415 COLL VENOUS BLD VENIPUNCTURE: CPT | Performed by: CLINICAL MEDICAL LABORATORY

## 2020-12-21 PROCEDURE — 85018 HEMOGLOBIN: CPT | Performed by: CLINICAL MEDICAL LABORATORY

## 2020-12-21 ASSESSMENT — PATIENT HEALTH QUESTIONNAIRE - PHQ9
2. FEELING DOWN, DEPRESSED OR HOPELESS: NOT AT ALL
1. LITTLE INTEREST OR PLEASURE IN DOING THINGS: NOT AT ALL
CLINICAL INTERPRETATION OF PHQ2 SCORE: NO FURTHER SCREENING NEEDED
SUM OF ALL RESPONSES TO PHQ9 QUESTIONS 1 AND 2: 0
SUM OF ALL RESPONSES TO PHQ9 QUESTIONS 1 AND 2: 0
CLINICAL INTERPRETATION OF PHQ9 SCORE: NO FURTHER SCREENING NEEDED

## 2020-12-21 ASSESSMENT — ENCOUNTER SYMPTOMS
ENDOCRINE NEGATIVE: 1
PSYCHIATRIC NEGATIVE: 1
NEUROLOGICAL NEGATIVE: 1
BACK PAIN: 1
RESPIRATORY NEGATIVE: 1
ABDOMINAL PAIN: 1
CONSTITUTIONAL NEGATIVE: 1
EYES NEGATIVE: 1

## 2020-12-22 LAB — HGB BLD-MCNC: 9.9 G/DL (ref 13–17)

## 2020-12-29 RX ORDER — ALPRAZOLAM 0.25 MG/1
TABLET ORAL
Qty: 60 TABLET | Refills: 0 | Status: SHIPPED | OUTPATIENT
Start: 2020-12-29 | End: 2021-03-03

## 2020-12-30 ENCOUNTER — TELEPHONE (OUTPATIENT)
Dept: SCHEDULING | Age: 85
End: 2020-12-30

## 2021-01-11 ENCOUNTER — TELEPHONE (OUTPATIENT)
Dept: SCHEDULING | Age: 86
End: 2021-01-11

## 2021-03-03 RX ORDER — ALPRAZOLAM 0.25 MG/1
TABLET ORAL
Qty: 60 TABLET | Refills: 0 | Status: SHIPPED | OUTPATIENT
Start: 2021-03-03 | End: 2021-04-10

## 2021-03-09 ENCOUNTER — TELEPHONE (OUTPATIENT)
Dept: SCHEDULING | Age: 86
End: 2021-03-09

## 2021-03-11 ENCOUNTER — TELEPHONE (OUTPATIENT)
Dept: SCHEDULING | Age: 86
End: 2021-03-11

## 2021-03-22 ENCOUNTER — LAB SERVICES (OUTPATIENT)
Dept: LAB | Age: 86
End: 2021-03-22

## 2021-03-22 ENCOUNTER — OFFICE VISIT (OUTPATIENT)
Dept: INTERNAL MEDICINE | Age: 86
End: 2021-03-22

## 2021-03-22 DIAGNOSIS — D64.9 ANEMIA, UNSPECIFIED TYPE: ICD-10-CM

## 2021-03-22 DIAGNOSIS — F41.8 DEPRESSION WITH ANXIETY: ICD-10-CM

## 2021-03-22 DIAGNOSIS — F32.A FATIGUE DUE TO DEPRESSION: ICD-10-CM

## 2021-03-22 DIAGNOSIS — Z98.61 CAD S/P PERCUTANEOUS CORONARY ANGIOPLASTY: ICD-10-CM

## 2021-03-22 DIAGNOSIS — R53.83 FATIGUE DUE TO DEPRESSION: ICD-10-CM

## 2021-03-22 DIAGNOSIS — F33.1 MAJOR DEPRESSIVE DISORDER, RECURRENT EPISODE, MODERATE (CMD): ICD-10-CM

## 2021-03-22 DIAGNOSIS — N21.0 BLADDER STONES: ICD-10-CM

## 2021-03-22 DIAGNOSIS — Z00.00 PHYSICAL EXAM, ROUTINE: Primary | ICD-10-CM

## 2021-03-22 DIAGNOSIS — K21.9 CHRONIC GERD: ICD-10-CM

## 2021-03-22 DIAGNOSIS — R31.29 HEMATURIA, MICROSCOPIC: ICD-10-CM

## 2021-03-22 DIAGNOSIS — Z00.00 PHYSICAL EXAM, ROUTINE: ICD-10-CM

## 2021-03-22 DIAGNOSIS — I25.10 CAD S/P PERCUTANEOUS CORONARY ANGIOPLASTY: ICD-10-CM

## 2021-03-22 DIAGNOSIS — K40.90 NON-RECURRENT UNILATERAL INGUINAL HERNIA WITHOUT OBSTRUCTION OR GANGRENE: ICD-10-CM

## 2021-03-22 LAB
BASOPHILS # BLD: 0 K/MCL (ref 0–0.3)
BASOPHILS NFR BLD: 1 %
DEPRECATED RDW RBC: 55.2 FL (ref 39–50)
EOSINOPHIL # BLD: 0.1 K/MCL (ref 0–0.5)
EOSINOPHIL NFR BLD: 2 %
ERYTHROCYTE [DISTWIDTH] IN BLOOD: 17.3 % (ref 11–15)
HCT VFR BLD CALC: 31.3 % (ref 39–51)
HGB BLD-MCNC: 9.5 G/DL (ref 13–17)
IMM GRANULOCYTES # BLD AUTO: 0 K/MCL (ref 0–0.2)
IMM GRANULOCYTES # BLD: 1 %
LYMPHOCYTES # BLD: 1 K/MCL (ref 1–4)
LYMPHOCYTES NFR BLD: 24 %
MCH RBC QN AUTO: 26.7 PG (ref 26–34)
MCHC RBC AUTO-ENTMCNC: 30.4 G/DL (ref 32–36.5)
MCV RBC AUTO: 87.9 FL (ref 78–100)
MONOCYTES # BLD: 0.3 K/MCL (ref 0.3–0.9)
MONOCYTES NFR BLD: 8 %
NEUTROPHILS # BLD: 2.6 K/MCL (ref 1.8–7.7)
NEUTROPHILS NFR BLD: 64 %
NRBC BLD MANUAL-RTO: 0 /100 WBC
PLATELET # BLD AUTO: 208 K/MCL (ref 140–450)
RBC # BLD: 3.56 MIL/MCL (ref 4.5–5.9)
WBC # BLD: 4 K/MCL (ref 4.2–11)

## 2021-03-22 PROCEDURE — 36415 COLL VENOUS BLD VENIPUNCTURE: CPT | Performed by: CLINICAL MEDICAL LABORATORY

## 2021-03-22 PROCEDURE — 80061 LIPID PANEL: CPT | Performed by: CLINICAL MEDICAL LABORATORY

## 2021-03-22 PROCEDURE — 80053 COMPREHEN METABOLIC PANEL: CPT | Performed by: CLINICAL MEDICAL LABORATORY

## 2021-03-22 PROCEDURE — 99397 PER PM REEVAL EST PAT 65+ YR: CPT | Performed by: INTERNAL MEDICINE

## 2021-03-22 PROCEDURE — 81001 URINALYSIS AUTO W/SCOPE: CPT | Performed by: CLINICAL MEDICAL LABORATORY

## 2021-03-22 PROCEDURE — 85025 COMPLETE CBC W/AUTO DIFF WBC: CPT | Performed by: CLINICAL MEDICAL LABORATORY

## 2021-03-22 ASSESSMENT — PATIENT HEALTH QUESTIONNAIRE - PHQ9
1. LITTLE INTEREST OR PLEASURE IN DOING THINGS: NOT AT ALL
CLINICAL INTERPRETATION OF PHQ9 SCORE: NO FURTHER SCREENING NEEDED
SUM OF ALL RESPONSES TO PHQ9 QUESTIONS 1 AND 2: 0
SUM OF ALL RESPONSES TO PHQ9 QUESTIONS 1 AND 2: 0
2. FEELING DOWN, DEPRESSED OR HOPELESS: NOT AT ALL
CLINICAL INTERPRETATION OF PHQ2 SCORE: NO FURTHER SCREENING NEEDED

## 2021-03-22 ASSESSMENT — ENCOUNTER SYMPTOMS
EYES NEGATIVE: 1
FATIGUE: 1
AGITATION: 1
GASTROINTESTINAL NEGATIVE: 1
NEUROLOGICAL NEGATIVE: 1
ENDOCRINE NEGATIVE: 1
RESPIRATORY NEGATIVE: 1

## 2021-03-22 ASSESSMENT — COGNITIVE AND FUNCTIONAL STATUS - GENERAL
DO YOU HAVE DIFFICULTY DRESSING OR BATHING: NO
BECAUSE OF A PHYSICAL, MENTAL, OR EMOTIONAL CONDITION, DO YOU HAVE DIFFICULTY DOING ERRANDS ALONE: NO
DO YOU HAVE SERIOUS DIFFICULTY WALKING OR CLIMBING STAIRS: NO
BECAUSE OF A PHYSICAL, MENTAL, OR EMOTIONAL CONDITION, DO YOU HAVE SERIOUS DIFFICULTY CONCENTRATING, REMEMBERING OR MAKING DECISIONS: NO

## 2021-03-22 ASSESSMENT — PAIN SCALES - GENERAL: PAINLEVEL: 0

## 2021-03-23 LAB
ALBUMIN SERPL-MCNC: 4.1 G/DL (ref 3.6–5.1)
ALBUMIN/GLOB SERPL: 1.1 {RATIO} (ref 1–2.4)
ALP SERPL-CCNC: 88 UNITS/L (ref 45–117)
ALT SERPL-CCNC: 24 UNITS/L
AMORPH SED URNS QL MICRO: PRESENT
ANION GAP SERPL CALC-SCNC: 11 MMOL/L (ref 10–20)
APPEARANCE UR: ABNORMAL
AST SERPL-CCNC: 22 UNITS/L
BACTERIA #/AREA URNS HPF: ABNORMAL /HPF
BILIRUB SERPL-MCNC: 0.5 MG/DL (ref 0.2–1)
BILIRUB UR QL STRIP: NEGATIVE
BUN SERPL-MCNC: 18 MG/DL (ref 6–20)
BUN/CREAT SERPL: 20 (ref 7–25)
CALCIUM SERPL-MCNC: 9.5 MG/DL (ref 8.4–10.2)
CHLORIDE SERPL-SCNC: 106 MMOL/L (ref 98–107)
CHOLEST SERPL-MCNC: 109 MG/DL
CHOLEST/HDLC SERPL: 2.4 {RATIO}
CO2 SERPL-SCNC: 27 MMOL/L (ref 21–32)
COLOR UR: YELLOW
CREAT SERPL-MCNC: 0.9 MG/DL (ref 0.67–1.17)
FASTING DURATION TIME PATIENT: 0 HOURS
FASTING DURATION TIME PATIENT: NORMAL H
GFR SERPLBLD BASED ON 1.73 SQ M-ARVRAT: 78 ML/MIN/1.73M2
GLOBULIN SER-MCNC: 3.6 G/DL (ref 2–4)
GLUCOSE SERPL-MCNC: 100 MG/DL (ref 65–99)
GLUCOSE UR STRIP-MCNC: NEGATIVE MG/DL
HDLC SERPL-MCNC: 46 MG/DL
HGB UR QL STRIP: ABNORMAL
HYALINE CASTS #/AREA URNS LPF: ABNORMAL /LPF
KETONES UR STRIP-MCNC: NEGATIVE MG/DL
LDLC SERPL CALC-MCNC: 45 MG/DL
LEUKOCYTE ESTERASE UR QL STRIP: NEGATIVE
MUCOUS THREADS URNS QL MICRO: PRESENT
NITRITE UR QL STRIP: NEGATIVE
NONHDLC SERPL-MCNC: 63 MG/DL
PH UR STRIP: 5 [PH] (ref 5–7)
POTASSIUM SERPL-SCNC: 4.7 MMOL/L (ref 3.4–5.1)
PROT SERPL-MCNC: 7.7 G/DL (ref 6.4–8.2)
PROT UR STRIP-MCNC: NEGATIVE MG/DL
RBC #/AREA URNS HPF: ABNORMAL /HPF
SODIUM SERPL-SCNC: 139 MMOL/L (ref 135–145)
SP GR UR STRIP: 1.01 (ref 1–1.03)
SQUAMOUS #/AREA URNS HPF: ABNORMAL /HPF
TRIGL SERPL-MCNC: 91 MG/DL
UROBILINOGEN UR STRIP-MCNC: 0.2 MG/DL
WBC #/AREA URNS HPF: ABNORMAL /HPF

## 2021-03-29 ENCOUNTER — TELEPHONE (OUTPATIENT)
Dept: SCHEDULING | Age: 86
End: 2021-03-29

## 2021-04-10 RX ORDER — ALPRAZOLAM 0.25 MG/1
TABLET ORAL
Qty: 60 TABLET | Refills: 0 | Status: SHIPPED | OUTPATIENT
Start: 2021-04-10 | End: 2021-05-04

## 2021-05-04 RX ORDER — ALPRAZOLAM 0.25 MG/1
TABLET ORAL
Qty: 60 TABLET | Refills: 0 | Status: SHIPPED | OUTPATIENT
Start: 2021-05-04 | End: 2021-06-03

## 2021-05-25 VITALS
WEIGHT: 143 LBS | TEMPERATURE: 97 F | RESPIRATION RATE: 18 BRPM | WEIGHT: 140 LBS | DIASTOLIC BLOOD PRESSURE: 66 MMHG | BODY MASS INDEX: 21.67 KG/M2 | HEIGHT: 68 IN | HEIGHT: 68 IN | BODY MASS INDEX: 21.22 KG/M2 | DIASTOLIC BLOOD PRESSURE: 57 MMHG | HEIGHT: 68 IN | HEART RATE: 55 BPM | TEMPERATURE: 97 F | SYSTOLIC BLOOD PRESSURE: 119 MMHG | RESPIRATION RATE: 18 BRPM | OXYGEN SATURATION: 99 % | WEIGHT: 136 LBS | SYSTOLIC BLOOD PRESSURE: 132 MMHG | HEART RATE: 47 BPM | DIASTOLIC BLOOD PRESSURE: 61 MMHG | SYSTOLIC BLOOD PRESSURE: 128 MMHG | OXYGEN SATURATION: 97 % | RESPIRATION RATE: 17 BRPM | HEART RATE: 69 BPM | BODY MASS INDEX: 20.61 KG/M2 | OXYGEN SATURATION: 98 % | TEMPERATURE: 97 F

## 2021-06-03 RX ORDER — ALPRAZOLAM 0.25 MG/1
TABLET ORAL
Qty: 60 TABLET | Refills: 0 | Status: SHIPPED | OUTPATIENT
Start: 2021-06-03 | End: 2021-06-28

## 2021-06-08 ENCOUNTER — TELEPHONE (OUTPATIENT)
Dept: SCHEDULING | Age: 86
End: 2021-06-08

## 2021-06-09 ENCOUNTER — TELEPHONE (OUTPATIENT)
Dept: SCHEDULING | Age: 86
End: 2021-06-09

## 2021-06-10 ENCOUNTER — APPOINTMENT (OUTPATIENT)
Dept: INTERNAL MEDICINE | Age: 86
End: 2021-06-10

## 2021-06-14 ENCOUNTER — LAB SERVICES (OUTPATIENT)
Dept: LAB | Age: 86
End: 2021-06-14

## 2021-06-14 ENCOUNTER — OFFICE VISIT (OUTPATIENT)
Dept: INTERNAL MEDICINE | Age: 86
End: 2021-06-14

## 2021-06-14 VITALS
OXYGEN SATURATION: 99 % | BODY MASS INDEX: 20 KG/M2 | WEIGHT: 132 LBS | RESPIRATION RATE: 17 BRPM | HEART RATE: 58 BPM | HEIGHT: 68 IN

## 2021-06-14 DIAGNOSIS — I25.10 CAD S/P PERCUTANEOUS CORONARY ANGIOPLASTY: ICD-10-CM

## 2021-06-14 DIAGNOSIS — D64.9 ANEMIA, UNSPECIFIED TYPE: ICD-10-CM

## 2021-06-14 DIAGNOSIS — K21.9 CHRONIC GERD: ICD-10-CM

## 2021-06-14 DIAGNOSIS — Z71.3 NUTRITIONAL COUNSELING: ICD-10-CM

## 2021-06-14 DIAGNOSIS — R31.29 HEMATURIA, MICROSCOPIC: ICD-10-CM

## 2021-06-14 DIAGNOSIS — R63.4 WEIGHT LOSS: ICD-10-CM

## 2021-06-14 DIAGNOSIS — Z98.61 CAD S/P PERCUTANEOUS CORONARY ANGIOPLASTY: ICD-10-CM

## 2021-06-14 DIAGNOSIS — F41.8 DEPRESSION WITH ANXIETY: ICD-10-CM

## 2021-06-14 DIAGNOSIS — N21.0 BLADDER STONES: ICD-10-CM

## 2021-06-14 DIAGNOSIS — Z71.89 ADVANCED DIRECTIVES, COUNSELING/DISCUSSION: ICD-10-CM

## 2021-06-14 DIAGNOSIS — F33.1 MAJOR DEPRESSIVE DISORDER, RECURRENT EPISODE, MODERATE (CMD): Primary | ICD-10-CM

## 2021-06-14 DIAGNOSIS — R26.9 GAIT ABNORMALITY: ICD-10-CM

## 2021-06-14 DIAGNOSIS — K40.90 NON-RECURRENT UNILATERAL INGUINAL HERNIA WITHOUT OBSTRUCTION OR GANGRENE: ICD-10-CM

## 2021-06-14 DIAGNOSIS — D46.9 MDS (MYELODYSPLASTIC SYNDROME) (CMD): ICD-10-CM

## 2021-06-14 PROCEDURE — 85046 RETICYTE/HGB CONCENTRATE: CPT | Performed by: INTERNAL MEDICINE

## 2021-06-14 PROCEDURE — 85025 COMPLETE CBC W/AUTO DIFF WBC: CPT | Performed by: INTERNAL MEDICINE

## 2021-06-14 PROCEDURE — 99214 OFFICE O/P EST MOD 30 MIN: CPT | Performed by: INTERNAL MEDICINE

## 2021-06-14 PROCEDURE — 96372 THER/PROPH/DIAG INJ SC/IM: CPT | Performed by: INTERNAL MEDICINE

## 2021-06-14 PROCEDURE — 36415 COLL VENOUS BLD VENIPUNCTURE: CPT | Performed by: INTERNAL MEDICINE

## 2021-06-14 RX ORDER — CYANOCOBALAMIN 1000 UG/ML
1000 INJECTION, SOLUTION INTRAMUSCULAR; SUBCUTANEOUS ONCE
Status: COMPLETED | OUTPATIENT
Start: 2021-06-14 | End: 2021-06-14

## 2021-06-14 RX ADMIN — CYANOCOBALAMIN 1000 MCG: 1000 INJECTION, SOLUTION INTRAMUSCULAR; SUBCUTANEOUS at 13:51

## 2021-06-14 ASSESSMENT — PATIENT HEALTH QUESTIONNAIRE - PHQ9
CLINICAL INTERPRETATION OF PHQ2 SCORE: NO FURTHER SCREENING NEEDED
SUM OF ALL RESPONSES TO PHQ9 QUESTIONS 1 AND 2: 2
CLINICAL INTERPRETATION OF PHQ9 SCORE: NO FURTHER SCREENING NEEDED
2. FEELING DOWN, DEPRESSED OR HOPELESS: SEVERAL DAYS
SUM OF ALL RESPONSES TO PHQ9 QUESTIONS 1 AND 2: 2
1. LITTLE INTEREST OR PLEASURE IN DOING THINGS: SEVERAL DAYS

## 2021-06-14 ASSESSMENT — ENCOUNTER SYMPTOMS
NEUROLOGICAL NEGATIVE: 1
NERVOUS/ANXIOUS: 1
GASTROINTESTINAL NEGATIVE: 1
ENDOCRINE NEGATIVE: 1
FATIGUE: 1
RESPIRATORY NEGATIVE: 1
EYES NEGATIVE: 1

## 2021-06-14 ASSESSMENT — PAIN SCALES - GENERAL: PAINLEVEL: 0

## 2021-06-15 LAB
BASOPHILS # BLD: 0 K/MCL (ref 0–0.3)
BASOPHILS NFR BLD: 1 %
DEPRECATED RDW RBC: 64.8 FL (ref 39–50)
EOSINOPHIL # BLD: 0 K/MCL (ref 0–0.5)
EOSINOPHIL NFR BLD: 1 %
ERYTHROCYTE [DISTWIDTH] IN BLOOD: 21.7 % (ref 11–15)
HCT VFR BLD CALC: 27.2 % (ref 39–51)
HGB BLD-MCNC: 8 G/DL (ref 13–17)
IMM GRANULOCYTES # BLD AUTO: 0 K/MCL (ref 0–0.2)
IMM GRANULOCYTES # BLD: 0 %
LYMPHOCYTES # BLD: 0.7 K/MCL (ref 1–4)
LYMPHOCYTES NFR BLD: 18 %
MCH RBC QN AUTO: 24.5 PG (ref 26–34)
MCHC RBC AUTO-ENTMCNC: 29.4 G/DL (ref 32–36.5)
MCV RBC AUTO: 83.4 FL (ref 78–100)
MONOCYTES # BLD: 0.3 K/MCL (ref 0.3–0.9)
MONOCYTES NFR BLD: 7 %
NEUTROPHILS # BLD: 2.8 K/MCL (ref 1.8–7.7)
NEUTROPHILS NFR BLD: 73 %
NRBC BLD MANUAL-RTO: 0 /100 WBC
PLATELET # BLD AUTO: 209 K/MCL (ref 140–450)
RBC # BLD: 3.26 MIL/MCL (ref 4.5–5.9)
WBC # BLD: 3.9 K/MCL (ref 4.2–11)

## 2021-06-16 ENCOUNTER — TELEPHONE (OUTPATIENT)
Dept: SCHEDULING | Age: 86
End: 2021-06-16

## 2021-06-17 ENCOUNTER — TELEPHONE (OUTPATIENT)
Dept: SCHEDULING | Age: 86
End: 2021-06-17

## 2021-06-17 ENCOUNTER — TELEPHONE (OUTPATIENT)
Dept: INTERNAL MEDICINE | Age: 86
End: 2021-06-17

## 2021-06-17 DIAGNOSIS — D64.9 ANEMIA, UNSPECIFIED TYPE: Primary | ICD-10-CM

## 2021-06-17 LAB
HGB RETIC QN AUTO: 29.8 PG (ref 28.6–36.3)
IMM RETICS NFR: 28.9 % (ref 1.5–16)
RETICS #: 99 K/MCL (ref 10–120)
RETICS/RBC NFR: 3 % (ref 0.3–2.5)

## 2021-06-21 ENCOUNTER — TELEPHONE (OUTPATIENT)
Dept: SCHEDULING | Age: 86
End: 2021-06-21

## 2021-06-22 ENCOUNTER — TELEPHONE (OUTPATIENT)
Dept: FAMILY MEDICINE | Age: 86
End: 2021-06-22

## 2021-06-22 DIAGNOSIS — E61.1 LOW IRON: ICD-10-CM

## 2021-06-22 DIAGNOSIS — R31.29 HEMATURIA, MICROSCOPIC: Primary | ICD-10-CM

## 2021-06-28 RX ORDER — ALPRAZOLAM 0.25 MG/1
TABLET ORAL
Qty: 270 TABLET | Refills: 0 | Status: SHIPPED | OUTPATIENT
Start: 2021-06-28 | End: 2021-10-26

## 2021-07-15 RX ORDER — MIRTAZAPINE 30 MG/1
TABLET, FILM COATED ORAL
Qty: 90 TABLET | Refills: 0 | Status: SHIPPED | OUTPATIENT
Start: 2021-07-15 | End: 2021-11-18 | Stop reason: SDUPTHER

## 2021-07-23 ENCOUNTER — TELEPHONE (OUTPATIENT)
Dept: SCHEDULING | Age: 86
End: 2021-07-23

## 2021-08-30 NOTE — TELEPHONE ENCOUNTER
Spoke with patient. He says one aid is working and then stops,   Works a bit and stops. Offered appt tomorrow at 1pm and he will come in then. to bring LEs up to height of bed/minimum assist (75% patients effort)

## 2021-09-14 ENCOUNTER — AUDIOLOGY DOCUMENTATION (OUTPATIENT)
Dept: AUDIOLOGY | Facility: CLINIC | Age: 86
End: 2021-09-14

## 2021-09-14 NOTE — PROGRESS NOTES
Patient called. He is no longer hearing as well on TV from either of his hearing aids. He has difficulty getting to the office, so would like to schedule ear cleaning at same time as hearing aid check.    Scheduled with Dr. Trang Martins at 1:50 for ear cleani

## 2021-09-24 ENCOUNTER — OFFICE VISIT (OUTPATIENT)
Dept: OTOLARYNGOLOGY | Facility: CLINIC | Age: 86
End: 2021-09-24
Payer: MEDICARE

## 2021-09-24 ENCOUNTER — OFFICE VISIT (OUTPATIENT)
Dept: AUDIOLOGY | Facility: CLINIC | Age: 86
End: 2021-09-24
Payer: MEDICARE

## 2021-09-24 VITALS — WEIGHT: 139 LBS | BODY MASS INDEX: 21.07 KG/M2 | HEIGHT: 68 IN

## 2021-09-24 DIAGNOSIS — H91.93 BILATERAL HEARING LOSS, UNSPECIFIED HEARING LOSS TYPE: Primary | ICD-10-CM

## 2021-09-24 DIAGNOSIS — H61.23 BILATERAL IMPACTED CERUMEN: ICD-10-CM

## 2021-09-24 DIAGNOSIS — H90.3 SENSORINEURAL HEARING LOSS, BILATERAL: Primary | ICD-10-CM

## 2021-09-24 PROCEDURE — 92593 HEARING AID CHECK, BOTH EARS: CPT | Performed by: AUDIOLOGIST

## 2021-09-24 PROCEDURE — 3008F BODY MASS INDEX DOCD: CPT | Performed by: OTOLARYNGOLOGY

## 2021-09-24 PROCEDURE — 69210 REMOVE IMPACTED EAR WAX UNI: CPT | Performed by: OTOLARYNGOLOGY

## 2021-09-24 NOTE — PROGRESS NOTES
Jose D Dolan is a 82year old male. Patient presents with:  Ear Problem: pt is here today for an ear cleaning before the hearing aides.        HISTORY OF PRESENT ILLNESS    Patient presents for cerumen removal. No other complaints or concerns at this time Normal, Left: Normal. Canal - Right: Normal, Left: Normal. TM - Right: Normal, Left: Normal.   Skin Normal Inspection - Normal.                              Canals:  Right: Canal reveals cerumen impaction,   Left: Canal reveals cerumen impaction,     Tympa

## 2021-09-28 NOTE — PROGRESS NOTES
HEARING AID FOLLOW-UP    Om Sarah Tariq  1/29/1935  [id number]    Patient is here for hearing aid follow up.          Hearing Aid Information  Elvira Gear   Right #[id number]  #2 M reciever   Custom c-shell for right #[id number]  Dispensing date:

## 2021-10-19 RX ORDER — ALPRAZOLAM 0.25 MG/1
TABLET ORAL
Qty: 270 TABLET | Refills: 0 | OUTPATIENT
Start: 2021-10-19

## 2021-10-22 RX ORDER — ALPRAZOLAM 0.25 MG/1
TABLET ORAL
Qty: 270 TABLET | Refills: 0 | OUTPATIENT
Start: 2021-10-22

## 2021-10-26 RX ORDER — ALPRAZOLAM 0.25 MG/1
0.25 TABLET ORAL 3 TIMES DAILY
Qty: 270 TABLET | Refills: 0 | OUTPATIENT
Start: 2021-10-26

## 2021-10-26 RX ORDER — ALPRAZOLAM 0.25 MG/1
TABLET ORAL
Qty: 270 TABLET | Refills: 0 | OUTPATIENT
Start: 2021-10-26

## 2021-10-26 RX ORDER — ALPRAZOLAM 0.25 MG/1
TABLET ORAL
Qty: 270 TABLET | Refills: 0 | Status: SHIPPED | OUTPATIENT
Start: 2021-10-26 | End: 2022-04-18

## 2021-11-07 ENCOUNTER — WALK IN (OUTPATIENT)
Dept: URGENT CARE | Age: 86
End: 2021-11-07

## 2021-11-07 PROCEDURE — X1094 NO CHARGE VISIT: HCPCS

## 2021-11-18 ENCOUNTER — OFFICE VISIT (OUTPATIENT)
Dept: INTERNAL MEDICINE | Age: 86
End: 2021-11-18

## 2021-11-18 VITALS
TEMPERATURE: 96.2 F | HEIGHT: 68 IN | OXYGEN SATURATION: 98 % | WEIGHT: 137.2 LBS | RESPIRATION RATE: 17 BRPM | HEART RATE: 60 BPM | DIASTOLIC BLOOD PRESSURE: 61 MMHG | SYSTOLIC BLOOD PRESSURE: 119 MMHG | BODY MASS INDEX: 20.79 KG/M2

## 2021-11-18 DIAGNOSIS — K21.9 CHRONIC GERD: ICD-10-CM

## 2021-11-18 DIAGNOSIS — F41.8 DEPRESSION WITH ANXIETY: ICD-10-CM

## 2021-11-18 DIAGNOSIS — Z71.89 ADVANCED DIRECTIVES, COUNSELING/DISCUSSION: ICD-10-CM

## 2021-11-18 DIAGNOSIS — Z98.61 CAD S/P PERCUTANEOUS CORONARY ANGIOPLASTY: ICD-10-CM

## 2021-11-18 DIAGNOSIS — R00.1 SINUS BRADYCARDIA: Primary | ICD-10-CM

## 2021-11-18 DIAGNOSIS — Z00.00 HEALTHCARE MAINTENANCE: ICD-10-CM

## 2021-11-18 DIAGNOSIS — Z71.3 NUTRITIONAL COUNSELING: ICD-10-CM

## 2021-11-18 DIAGNOSIS — I25.10 CAD S/P PERCUTANEOUS CORONARY ANGIOPLASTY: ICD-10-CM

## 2021-11-18 DIAGNOSIS — F33.1 MAJOR DEPRESSIVE DISORDER, RECURRENT EPISODE, MODERATE (CMD): ICD-10-CM

## 2021-11-18 DIAGNOSIS — Z00.00 PHYSICAL EXAM, ROUTINE: ICD-10-CM

## 2021-11-18 DIAGNOSIS — D46.9 MDS (MYELODYSPLASTIC SYNDROME) (CMD): ICD-10-CM

## 2021-11-18 PROCEDURE — G0439 PPPS, SUBSEQ VISIT: HCPCS | Performed by: INTERNAL MEDICINE

## 2021-11-18 RX ORDER — MIRTAZAPINE 30 MG/1
30 TABLET, FILM COATED ORAL EVERY EVENING
Qty: 90 TABLET | Refills: 3 | Status: SHIPPED | OUTPATIENT
Start: 2021-11-18 | End: 2022-08-22 | Stop reason: SDUPTHER

## 2021-11-18 ASSESSMENT — ENCOUNTER SYMPTOMS
CONSTITUTIONAL NEGATIVE: 1
NEUROLOGICAL NEGATIVE: 1
ENDOCRINE NEGATIVE: 1
RESPIRATORY NEGATIVE: 1
AGITATION: 1
GASTROINTESTINAL NEGATIVE: 1
EYES NEGATIVE: 1

## 2021-11-18 ASSESSMENT — PATIENT HEALTH QUESTIONNAIRE - PHQ9
CLINICAL INTERPRETATION OF PHQ2 SCORE: NO FURTHER SCREENING NEEDED
2. FEELING DOWN, DEPRESSED OR HOPELESS: NOT AT ALL
SUM OF ALL RESPONSES TO PHQ9 QUESTIONS 1 AND 2: 0
SUM OF ALL RESPONSES TO PHQ9 QUESTIONS 1 AND 2: 0
1. LITTLE INTEREST OR PLEASURE IN DOING THINGS: NOT AT ALL
SUM OF ALL RESPONSES TO PHQ9 QUESTIONS 1 AND 2: 0
CLINICAL INTERPRETATION OF PHQ2 SCORE: NO FURTHER SCREENING NEEDED
1. LITTLE INTEREST OR PLEASURE IN DOING THINGS: NOT AT ALL
2. FEELING DOWN, DEPRESSED OR HOPELESS: NOT AT ALL
SUM OF ALL RESPONSES TO PHQ9 QUESTIONS 1 AND 2: 0

## 2021-11-18 ASSESSMENT — MINI COG
PATIENT ABLE TO FILL IN THE CLOCK FACE WITH 10 MINUTES PAST 11 O'CLOCK?: YES, CLOCK IS CORRECT
PATIENT WAS GIVEN REPEAT BACK WORDS FROM VERSION: 1 - BANANA SUNRISE CHAIR
PATIENT ABLE TO REPEAT THE 3 WORDS GIVEN PREVIOUSLY?: WAS ABLE TO REPEAT BACK 3 WORDS CORRECTLY
TOTAL SCORE: 5

## 2021-12-17 ENCOUNTER — AUDIOLOGY DOCUMENTATION (OUTPATIENT)
Dept: AUDIOLOGY | Facility: CLINIC | Age: 86
End: 2021-12-17

## 2021-12-17 NOTE — PROGRESS NOTES
[facility] Aid Information  Apple Damian   Right #[id number]  #2 M reciever   Custom c-shell for right #[id number]  Dispensing date:  65-96-09  Warranty: 3 years    This hearing aid was dropped off on 12-7 with broken c-shell and sent for repair.

## 2022-04-05 ENCOUNTER — TELEPHONE (OUTPATIENT)
Dept: SCHEDULING | Age: 87
End: 2022-04-05

## 2022-04-14 ENCOUNTER — APPOINTMENT (OUTPATIENT)
Dept: INTERNAL MEDICINE | Age: 87
End: 2022-04-14

## 2022-04-18 RX ORDER — ALPRAZOLAM 0.25 MG/1
TABLET ORAL
Qty: 270 TABLET | Refills: 0 | Status: SHIPPED | OUTPATIENT
Start: 2022-04-18 | End: 2022-08-22 | Stop reason: SDUPTHER

## 2022-04-25 ENCOUNTER — OFFICE VISIT (OUTPATIENT)
Dept: INTERNAL MEDICINE | Age: 87
End: 2022-04-25

## 2022-04-25 ENCOUNTER — IMAGING SERVICES (OUTPATIENT)
Dept: GENERAL RADIOLOGY | Age: 87
End: 2022-04-25
Attending: INTERNAL MEDICINE

## 2022-04-25 VITALS
DIASTOLIC BLOOD PRESSURE: 64 MMHG | RESPIRATION RATE: 18 BRPM | HEIGHT: 68 IN | TEMPERATURE: 98.6 F | HEART RATE: 60 BPM | OXYGEN SATURATION: 97 % | BODY MASS INDEX: 21.22 KG/M2 | WEIGHT: 140 LBS | SYSTOLIC BLOOD PRESSURE: 118 MMHG

## 2022-04-25 DIAGNOSIS — M54.50 ACUTE MIDLINE LOW BACK PAIN WITHOUT SCIATICA: ICD-10-CM

## 2022-04-25 DIAGNOSIS — R63.4 WEIGHT LOSS: ICD-10-CM

## 2022-04-25 DIAGNOSIS — F33.1 MAJOR DEPRESSIVE DISORDER, RECURRENT EPISODE, MODERATE (CMD): ICD-10-CM

## 2022-04-25 DIAGNOSIS — D46.9 MDS (MYELODYSPLASTIC SYNDROME) (CMD): ICD-10-CM

## 2022-04-25 DIAGNOSIS — R26.9 GAIT ABNORMALITY: ICD-10-CM

## 2022-04-25 DIAGNOSIS — Z71.89 ADVANCED DIRECTIVES, COUNSELING/DISCUSSION: ICD-10-CM
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
## 2022-04-25 DIAGNOSIS — D64.9 ANEMIA, UNSPECIFIED TYPE: ICD-10-CM

## 2022-04-25 DIAGNOSIS — Z00.00 PHYSICAL EXAM, ROUTINE: ICD-10-CM

## 2022-04-25 DIAGNOSIS — Z71.3 NUTRITIONAL COUNSELING: Primary | ICD-10-CM

## 2022-04-25 DIAGNOSIS — K21.9 CHRONIC GERD: ICD-10-CM

## 2022-04-25 DIAGNOSIS — Z98.61 CAD S/P PERCUTANEOUS CORONARY ANGIOPLASTY: ICD-10-CM

## 2022-04-25 DIAGNOSIS — I25.10 CAD S/P PERCUTANEOUS CORONARY ANGIOPLASTY: ICD-10-CM

## 2022-04-25 PROCEDURE — G0439 PPPS, SUBSEQ VISIT: HCPCS | Performed by: INTERNAL MEDICINE

## 2022-04-25 PROCEDURE — 72100 X-RAY EXAM L-S SPINE 2/3 VWS: CPT | Performed by: RADIOLOGY

## 2022-04-25 PROCEDURE — 72170 X-RAY EXAM OF PELVIS: CPT | Performed by: RADIOLOGY

## 2022-04-25 PROCEDURE — 99214 OFFICE O/P EST MOD 30 MIN: CPT | Performed by: INTERNAL MEDICINE

## 2022-04-25 ASSESSMENT — ENCOUNTER SYMPTOMS
RESPIRATORY NEGATIVE: 1
BACK PAIN: 1
ENDOCRINE NEGATIVE: 1
GASTROINTESTINAL NEGATIVE: 1
EYES NEGATIVE: 1
AGITATION: 1
CONSTITUTIONAL NEGATIVE: 1

## 2022-04-25 ASSESSMENT — COGNITIVE AND FUNCTIONAL STATUS - GENERAL
BECAUSE OF A PHYSICAL, MENTAL, OR EMOTIONAL CONDITION, DO YOU HAVE SERIOUS DIFFICULTY CONCENTRATING, REMEMBERING OR MAKING DECISIONS: NO
DO YOU HAVE SERIOUS DIFFICULTY WALKING OR CLIMBING STAIRS: YES
BECAUSE OF A PHYSICAL, MENTAL, OR EMOTIONAL CONDITION, DO YOU HAVE DIFFICULTY DOING ERRANDS ALONE: NO
DO YOU HAVE DIFFICULTY DRESSING OR BATHING: NO

## 2022-04-25 ASSESSMENT — MINI COG
PATIENT WAS GIVEN REPEAT BACK WORDS FROM VERSION: 1 - BANANA SUNRISE CHAIR
PATIENT ABLE TO REPEAT THE 3 WORDS GIVEN PREVIOUSLY?: WAS NOT ABLE TO REPEAT BACK ANY WORDS CORRECTLY
PATIENT ABLE TO FILL IN THE CLOCK FACE WITH 10 MINUTES PAST 11 O'CLOCK?: NO, CLOCK IS NOT CORRECT
TOTAL SCORE: 0

## 2022-04-25 ASSESSMENT — PATIENT HEALTH QUESTIONNAIRE - PHQ9
1. LITTLE INTEREST OR PLEASURE IN DOING THINGS: NOT AT ALL
2. FEELING DOWN, DEPRESSED OR HOPELESS: NOT AT ALL
SUM OF ALL RESPONSES TO PHQ9 QUESTIONS 1 AND 2: 0
CLINICAL INTERPRETATION OF PHQ2 SCORE: NO FURTHER SCREENING NEEDED
SUM OF ALL RESPONSES TO PHQ9 QUESTIONS 1 AND 2: 0

## 2022-04-25 ASSESSMENT — PAIN SCALES - GENERAL: PAINLEVEL: 6

## 2022-04-26 ENCOUNTER — LAB SERVICES (OUTPATIENT)
Dept: LAB | Age: 87
End: 2022-04-26

## 2022-04-26 DIAGNOSIS — D64.9 ANEMIA, UNSPECIFIED TYPE: ICD-10-CM

## 2022-04-26 DIAGNOSIS — Z00.00 PHYSICAL EXAM, ROUTINE: ICD-10-CM

## 2022-04-26 DIAGNOSIS — E61.1 LOW IRON: ICD-10-CM

## 2022-04-26 LAB
BASOPHILS # BLD: 0 K/MCL (ref 0–0.3)
BASOPHILS NFR BLD: 1 %
DEPRECATED RDW RBC: 57 FL (ref 39–50)
EOSINOPHIL # BLD: 0.1 K/MCL (ref 0–0.5)
EOSINOPHIL NFR BLD: 1 %
ERYTHROCYTE [DISTWIDTH] IN BLOOD: 16.2 % (ref 11–15)
HCT VFR BLD CALC: 30.8 % (ref 39–51)
HGB BLD-MCNC: 9.5 G/DL (ref 13–17)
IMM GRANULOCYTES # BLD AUTO: 0 K/MCL (ref 0–0.2)
IMM GRANULOCYTES # BLD: 1 %
LYMPHOCYTES # BLD: 0.7 K/MCL (ref 1–4)
LYMPHOCYTES NFR BLD: 17 %
MCH RBC QN AUTO: 29.1 PG (ref 26–34)
MCHC RBC AUTO-ENTMCNC: 30.8 G/DL (ref 32–36.5)
MCV RBC AUTO: 94.5 FL (ref 78–100)
MONOCYTES # BLD: 0.3 K/MCL (ref 0.3–0.9)
MONOCYTES NFR BLD: 7 %
NEUTROPHILS # BLD: 3.2 K/MCL (ref 1.8–7.7)
NEUTROPHILS NFR BLD: 73 %
NRBC BLD MANUAL-RTO: 0 /100 WBC
PLATELET # BLD AUTO: 200 K/MCL (ref 140–450)
RBC # BLD: 3.26 MIL/MCL (ref 4.5–5.9)
WBC # BLD: 4.3 K/MCL (ref 4.2–11)

## 2022-04-26 PROCEDURE — 80061 LIPID PANEL: CPT | Performed by: INTERNAL MEDICINE

## 2022-04-26 PROCEDURE — 85025 COMPLETE CBC W/AUTO DIFF WBC: CPT | Performed by: INTERNAL MEDICINE

## 2022-04-26 PROCEDURE — 81003 URINALYSIS AUTO W/O SCOPE: CPT | Performed by: INTERNAL MEDICINE

## 2022-04-26 PROCEDURE — 36415 COLL VENOUS BLD VENIPUNCTURE: CPT | Performed by: INTERNAL MEDICINE

## 2022-04-26 PROCEDURE — 80053 COMPREHEN METABOLIC PANEL: CPT | Performed by: INTERNAL MEDICINE

## 2022-04-26 PROCEDURE — 83540 ASSAY OF IRON: CPT | Performed by: INTERNAL MEDICINE

## 2022-04-26 PROCEDURE — 82728 ASSAY OF FERRITIN: CPT | Performed by: INTERNAL MEDICINE

## 2022-04-27 LAB
ALBUMIN SERPL-MCNC: 4 G/DL (ref 3.6–5.1)
ALBUMIN/GLOB SERPL: 1.1 {RATIO} (ref 1–2.4)
ALP SERPL-CCNC: 85 UNITS/L (ref 45–117)
ALT SERPL-CCNC: 23 UNITS/L
ANION GAP SERPL CALC-SCNC: 8 MMOL/L (ref 10–20)
APPEARANCE UR: CLEAR
AST SERPL-CCNC: 25 UNITS/L
BILIRUB SERPL-MCNC: 0.7 MG/DL (ref 0.2–1)
BILIRUB UR QL STRIP: NEGATIVE
BUN SERPL-MCNC: 27 MG/DL (ref 6–20)
BUN/CREAT SERPL: 37 (ref 7–25)
CALCIUM SERPL-MCNC: 9.6 MG/DL (ref 8.4–10.2)
CHLORIDE SERPL-SCNC: 105 MMOL/L (ref 98–107)
CHOLEST SERPL-MCNC: 90 MG/DL
CHOLEST/HDLC SERPL: 2.1 {RATIO}
CO2 SERPL-SCNC: 25 MMOL/L (ref 21–32)
COLOR UR: NORMAL
CREAT SERPL-MCNC: 0.73 MG/DL (ref 0.67–1.17)
FASTING DURATION TIME PATIENT: 8 HOURS (ref 0–999)
FASTING DURATION TIME PATIENT: NORMAL H
FERRITIN SERPL-MCNC: 16 NG/ML (ref 26–388)
GFR SERPLBLD BASED ON 1.73 SQ M-ARVRAT: 89 ML/MIN
GLOBULIN SER-MCNC: 3.5 G/DL (ref 2–4)
GLUCOSE SERPL-MCNC: 110 MG/DL (ref 70–99)
GLUCOSE UR STRIP-MCNC: NEGATIVE MG/DL
HDLC SERPL-MCNC: 42 MG/DL
HGB UR QL STRIP: NEGATIVE
IRON SERPL-MCNC: 29 MCG/DL (ref 65–175)
KETONES UR STRIP-MCNC: NEGATIVE MG/DL
LDLC SERPL CALC-MCNC: 37 MG/DL
LEUKOCYTE ESTERASE UR QL STRIP: NEGATIVE
NITRITE UR QL STRIP: NEGATIVE
NONHDLC SERPL-MCNC: 48 MG/DL
PH UR STRIP: 6 [PH] (ref 5–7)
POTASSIUM SERPL-SCNC: 4.1 MMOL/L (ref 3.4–5.1)
PROT SERPL-MCNC: 7.5 G/DL (ref 6.4–8.2)
PROT UR STRIP-MCNC: NEGATIVE MG/DL
SODIUM SERPL-SCNC: 134 MMOL/L (ref 135–145)
SP GR UR STRIP: 1.01 (ref 1–1.03)
TRIGL SERPL-MCNC: 57 MG/DL
UROBILINOGEN UR STRIP-MCNC: 0.2 MG/DL

## 2022-04-29 ENCOUNTER — TELEPHONE (OUTPATIENT)
Dept: SCHEDULING | Age: 87
End: 2022-04-29

## 2022-04-30 ENCOUNTER — TELEPHONE (OUTPATIENT)
Dept: SCHEDULING | Age: 87
End: 2022-04-30

## 2022-05-02 ENCOUNTER — TELEPHONE (OUTPATIENT)
Dept: SCHEDULING | Age: 87
End: 2022-05-02

## 2022-05-06 ENCOUNTER — TELEPHONE (OUTPATIENT)
Dept: SCHEDULING | Age: 87
End: 2022-05-06

## 2022-05-06 ENCOUNTER — OFFICE VISIT (OUTPATIENT)
Dept: INTERNAL MEDICINE | Age: 87
End: 2022-05-06

## 2022-05-06 VITALS
TEMPERATURE: 98.4 F | RESPIRATION RATE: 18 BRPM | WEIGHT: 142 LBS | HEIGHT: 68 IN | HEART RATE: 67 BPM | OXYGEN SATURATION: 99 % | DIASTOLIC BLOOD PRESSURE: 60 MMHG | BODY MASS INDEX: 21.52 KG/M2 | SYSTOLIC BLOOD PRESSURE: 100 MMHG

## 2022-05-06 DIAGNOSIS — U07.1 COVID-19 VIRUS INFECTION: Primary | ICD-10-CM

## 2022-05-06 PROCEDURE — 99214 OFFICE O/P EST MOD 30 MIN: CPT | Performed by: INTERNAL MEDICINE

## 2022-05-06 ASSESSMENT — ENCOUNTER SYMPTOMS
GASTROINTESTINAL NEGATIVE: 1
RHINORRHEA: 1
SORE THROAT: 1
CONSTITUTIONAL NEGATIVE: 1
SINUS PRESSURE: 1
COUGH: 0
RESPIRATORY NEGATIVE: 1

## 2022-05-06 ASSESSMENT — PATIENT HEALTH QUESTIONNAIRE - PHQ9
SUM OF ALL RESPONSES TO PHQ9 QUESTIONS 1 AND 2: 0
2. FEELING DOWN, DEPRESSED OR HOPELESS: NOT AT ALL
CLINICAL INTERPRETATION OF PHQ2 SCORE: NO FURTHER SCREENING NEEDED
1. LITTLE INTEREST OR PLEASURE IN DOING THINGS: NOT AT ALL
SUM OF ALL RESPONSES TO PHQ9 QUESTIONS 1 AND 2: 0

## 2022-05-06 ASSESSMENT — PAIN SCALES - GENERAL: PAINLEVEL: 0

## 2022-05-10 ENCOUNTER — TELEPHONE (OUTPATIENT)
Dept: AUDIOLOGY | Facility: CLINIC | Age: 87
End: 2022-05-10

## 2022-05-10 ENCOUNTER — AUDIOLOGY DOCUMENTATION (OUTPATIENT)
Dept: AUDIOLOGY | Facility: CLINIC | Age: 87
End: 2022-05-10

## 2022-05-10 NOTE — TELEPHONE ENCOUNTER
Spoke to patient . He states TV volume is much louder now than in past as well as the fact that it sounds crackly. FB was audible today during phone call so hearing aid appears to be functional.   Patient would like to come in for hearing and hearing aid check. Scheduled appt for June 9 at 2pm.   Asked him if this might be an earwax problem and he does not believe so but I will need to check ears at time of visit.

## 2022-05-10 NOTE — PROGRESS NOTES
Patient's wife, Aileen Vale, left message on audiology voice mail. She is wondering when 3 years are up (benefits are renewed every three years). Advised he last got hearing aids in November 2019, so this November will be three years. Wife states TV is so loud and he is asking for repetition a lot. Reminded them to check wax guard on his aid as well as his back up aids. Also advised that they can drop hearing aid(s) off for me to check if they are suspecting a problem with aid. She will discuss with patient.

## 2022-06-09 ENCOUNTER — OFFICE VISIT (OUTPATIENT)
Dept: AUDIOLOGY | Facility: CLINIC | Age: 87
End: 2022-06-09
Payer: MEDICARE

## 2022-06-09 ENCOUNTER — OFFICE VISIT (OUTPATIENT)
Dept: OTOLARYNGOLOGY | Facility: CLINIC | Age: 87
End: 2022-06-09
Payer: MEDICARE

## 2022-06-09 VITALS — BODY MASS INDEX: 21.22 KG/M2 | WEIGHT: 140 LBS | HEIGHT: 68 IN

## 2022-06-09 DIAGNOSIS — H91.13 PRESBYCUSIS OF BOTH EARS: ICD-10-CM

## 2022-06-09 DIAGNOSIS — H61.23 BILATERAL IMPACTED CERUMEN: Primary | ICD-10-CM

## 2022-06-09 DIAGNOSIS — H90.3 SENSORINEURAL HEARING LOSS, BILATERAL: Primary | ICD-10-CM

## 2022-06-09 PROCEDURE — 92593 HEARING AID CHECK, BOTH EARS: CPT | Performed by: AUDIOLOGIST

## 2022-06-09 PROCEDURE — 99213 OFFICE O/P EST LOW 20 MIN: CPT | Performed by: OTOLARYNGOLOGY

## 2022-06-09 PROCEDURE — 3008F BODY MASS INDEX DOCD: CPT | Performed by: OTOLARYNGOLOGY

## 2022-06-27 ENCOUNTER — TELEPHONE (OUTPATIENT)
Dept: AUDIOLOGY | Facility: CLINIC | Age: 87
End: 2022-06-27

## 2022-06-27 NOTE — TELEPHONE ENCOUNTER
Per wife it is important she speaks to Standard Ransom and states she will know what it is about and refused any further information.  Please advise

## 2022-06-28 NOTE — TELEPHONE ENCOUNTER
Spoke to patient's wife. He's complaining of sound too loud. (We did increase gain at last visit). Will revert back to previous settings. Reminded of Volume control to adjust if needed, but he has trouble managing this. Offered appt July 1 or July 5 at noon.    THey will let me know which time works best.

## 2022-08-22 ENCOUNTER — LAB SERVICES (OUTPATIENT)
Dept: LAB | Age: 87
End: 2022-08-22

## 2022-08-22 ENCOUNTER — OFFICE VISIT (OUTPATIENT)
Dept: INTERNAL MEDICINE | Age: 87
End: 2022-08-22

## 2022-08-22 VITALS
HEART RATE: 72 BPM | RESPIRATION RATE: 18 BRPM | OXYGEN SATURATION: 96 % | SYSTOLIC BLOOD PRESSURE: 130 MMHG | DIASTOLIC BLOOD PRESSURE: 80 MMHG | TEMPERATURE: 99 F | BODY MASS INDEX: 20.46 KG/M2 | HEIGHT: 68 IN | WEIGHT: 135 LBS

## 2022-08-22 DIAGNOSIS — D46.9 MDS (MYELODYSPLASTIC SYNDROME) (CMD): ICD-10-CM

## 2022-08-22 DIAGNOSIS — Z00.00 PHYSICAL EXAM, ROUTINE: Primary | ICD-10-CM

## 2022-08-22 DIAGNOSIS — Z98.61 CAD S/P PERCUTANEOUS CORONARY ANGIOPLASTY: ICD-10-CM

## 2022-08-22 DIAGNOSIS — F33.1 MAJOR DEPRESSIVE DISORDER, RECURRENT EPISODE, MODERATE (CMD): ICD-10-CM

## 2022-08-22 DIAGNOSIS — Z00.00 PHYSICAL EXAM, ROUTINE: ICD-10-CM

## 2022-08-22 DIAGNOSIS — I25.10 CAD S/P PERCUTANEOUS CORONARY ANGIOPLASTY: ICD-10-CM

## 2022-08-22 DIAGNOSIS — K40.90 NON-RECURRENT UNILATERAL INGUINAL HERNIA WITHOUT OBSTRUCTION OR GANGRENE: ICD-10-CM

## 2022-08-22 DIAGNOSIS — M54.50 ACUTE MIDLINE LOW BACK PAIN WITHOUT SCIATICA: ICD-10-CM

## 2022-08-22 DIAGNOSIS — E78.00 PURE HYPERCHOLESTEROLEMIA: ICD-10-CM

## 2022-08-22 DIAGNOSIS — Z71.3 NUTRITIONAL COUNSELING: ICD-10-CM

## 2022-08-22 DIAGNOSIS — R26.9 GAIT ABNORMALITY: ICD-10-CM

## 2022-08-22 PROCEDURE — 85025 COMPLETE CBC W/AUTO DIFF WBC: CPT | Performed by: INTERNAL MEDICINE

## 2022-08-22 PROCEDURE — 99214 OFFICE O/P EST MOD 30 MIN: CPT | Performed by: INTERNAL MEDICINE

## 2022-08-22 PROCEDURE — 36415 COLL VENOUS BLD VENIPUNCTURE: CPT | Performed by: INTERNAL MEDICINE

## 2022-08-22 PROCEDURE — 80053 COMPREHEN METABOLIC PANEL: CPT | Performed by: INTERNAL MEDICINE

## 2022-08-22 PROCEDURE — 81003 URINALYSIS AUTO W/O SCOPE: CPT | Performed by: INTERNAL MEDICINE

## 2022-08-22 PROCEDURE — 80061 LIPID PANEL: CPT | Performed by: INTERNAL MEDICINE

## 2022-08-22 RX ORDER — LATANOPROST 50 UG/ML
SOLUTION/ DROPS OPHTHALMIC
COMMUNITY
Start: 2022-07-17

## 2022-08-22 RX ORDER — MIRTAZAPINE 30 MG/1
30 TABLET, FILM COATED ORAL EVERY EVENING
Qty: 90 TABLET | Refills: 3 | Status: SHIPPED | OUTPATIENT
Start: 2022-08-22

## 2022-08-22 RX ORDER — ALPRAZOLAM 0.25 MG/1
0.25 TABLET ORAL 3 TIMES DAILY
Qty: 270 TABLET | Refills: 0 | Status: SHIPPED | OUTPATIENT
Start: 2022-08-22 | End: 2022-11-17

## 2022-08-22 RX ORDER — BRIMONIDINE TARTRATE 2 MG/ML
SOLUTION/ DROPS OPHTHALMIC
COMMUNITY
Start: 2022-06-18

## 2022-08-22 ASSESSMENT — ENCOUNTER SYMPTOMS
CONSTIPATION: 1
BACK PAIN: 1
WEAKNESS: 1
EYES NEGATIVE: 1
ENDOCRINE NEGATIVE: 1
RESPIRATORY NEGATIVE: 1
FATIGUE: 1
AGITATION: 1

## 2022-08-22 ASSESSMENT — PAIN SCALES - GENERAL: PAINLEVEL: 4

## 2022-08-23 ENCOUNTER — TELEPHONE (OUTPATIENT)
Dept: INTERNAL MEDICINE | Age: 87
End: 2022-08-23

## 2022-08-23 LAB
ALBUMIN SERPL-MCNC: 4 G/DL (ref 3.6–5.1)
ALBUMIN/GLOB SERPL: 1.2 {RATIO} (ref 1–2.4)
ALP SERPL-CCNC: 84 UNITS/L (ref 45–117)
ALT SERPL-CCNC: 25 UNITS/L
ANION GAP SERPL CALC-SCNC: 10 MMOL/L (ref 7–19)
APPEARANCE UR: CLEAR
AST SERPL-CCNC: 26 UNITS/L
BASOPHILS # BLD: 0 K/MCL (ref 0–0.3)
BASOPHILS NFR BLD: 1 %
BILIRUB SERPL-MCNC: 0.7 MG/DL (ref 0.2–1)
BILIRUB UR QL STRIP: NEGATIVE
BUN SERPL-MCNC: 19 MG/DL (ref 6–20)
BUN/CREAT SERPL: 23 (ref 7–25)
CALCIUM SERPL-MCNC: 9.3 MG/DL (ref 8.4–10.2)
CHLORIDE SERPL-SCNC: 106 MMOL/L (ref 97–110)
CHOLEST SERPL-MCNC: 87 MG/DL
CHOLEST/HDLC SERPL: 2 {RATIO}
CO2 SERPL-SCNC: 26 MMOL/L (ref 21–32)
COLOR UR: YELLOW
CREAT SERPL-MCNC: 0.84 MG/DL (ref 0.67–1.17)
DEPRECATED RDW RBC: 69 FL (ref 39–50)
EOSINOPHIL # BLD: 0.1 K/MCL (ref 0–0.5)
EOSINOPHIL NFR BLD: 2 %
ERYTHROCYTE [DISTWIDTH] IN BLOOD: 20.7 % (ref 11–15)
FASTING DURATION TIME PATIENT: ABNORMAL H
FASTING DURATION TIME PATIENT: NORMAL H
GFR SERPLBLD BASED ON 1.73 SQ M-ARVRAT: 85 ML/MIN
GLOBULIN SER-MCNC: 3.3 G/DL (ref 2–4)
GLUCOSE SERPL-MCNC: 103 MG/DL (ref 70–99)
GLUCOSE UR STRIP-MCNC: NEGATIVE MG/DL
HCT VFR BLD CALC: 28.1 % (ref 39–51)
HDLC SERPL-MCNC: 43 MG/DL
HGB BLD-MCNC: 8.6 G/DL (ref 13–17)
HGB UR QL STRIP: NEGATIVE
IMM GRANULOCYTES # BLD AUTO: 0 K/MCL (ref 0–0.2)
IMM GRANULOCYTES # BLD: 1 %
KETONES UR STRIP-MCNC: NEGATIVE MG/DL
LDLC SERPL CALC-MCNC: 34 MG/DL
LEUKOCYTE ESTERASE UR QL STRIP: NEGATIVE
LYMPHOCYTES # BLD: 0.9 K/MCL (ref 1–4)
LYMPHOCYTES NFR BLD: 23 %
MCH RBC QN AUTO: 27.8 PG (ref 26–34)
MCHC RBC AUTO-ENTMCNC: 30.6 G/DL (ref 32–36.5)
MCV RBC AUTO: 90.9 FL (ref 78–100)
MONOCYTES # BLD: 0.3 K/MCL (ref 0.3–0.9)
MONOCYTES NFR BLD: 7 %
NEUTROPHILS # BLD: 2.7 K/MCL (ref 1.8–7.7)
NEUTROPHILS NFR BLD: 66 %
NITRITE UR QL STRIP: NEGATIVE
NONHDLC SERPL-MCNC: 44 MG/DL
NRBC BLD MANUAL-RTO: 0 /100 WBC
PH UR STRIP: 5 [PH] (ref 5–7)
PLATELET # BLD AUTO: 199 K/MCL (ref 140–450)
POTASSIUM SERPL-SCNC: 4.4 MMOL/L (ref 3.4–5.1)
PROT SERPL-MCNC: 7.3 G/DL (ref 6.4–8.2)
PROT UR STRIP-MCNC: NEGATIVE MG/DL
RBC # BLD: 3.09 MIL/MCL (ref 4.5–5.9)
SODIUM SERPL-SCNC: 138 MMOL/L (ref 135–145)
SP GR UR STRIP: 1.01 (ref 1–1.03)
TRIGL SERPL-MCNC: 51 MG/DL
UROBILINOGEN UR STRIP-MCNC: 0.2 MG/DL
WBC # BLD: 4 K/MCL (ref 4.2–11)

## 2022-08-23 RX ORDER — ALPRAZOLAM 0.25 MG/1
TABLET ORAL
Qty: 270 TABLET | Refills: 0 | OUTPATIENT
Start: 2022-08-23

## 2022-10-05 ENCOUNTER — TELEPHONE (OUTPATIENT)
Dept: SCHEDULING | Age: 87
End: 2022-10-05

## 2022-10-19 ENCOUNTER — TELEPHONE (OUTPATIENT)
Dept: INTERNAL MEDICINE | Age: 87
End: 2022-10-19

## 2022-10-20 ENCOUNTER — TELEPHONE (OUTPATIENT)
Dept: INTERNAL MEDICINE | Age: 87
End: 2022-10-20

## 2022-10-20 ENCOUNTER — NURSE TRIAGE (OUTPATIENT)
Dept: TELEHEALTH | Age: 87
End: 2022-10-20
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
## 2022-10-21 ENCOUNTER — OFFICE VISIT (OUTPATIENT)
Dept: INTERNAL MEDICINE | Age: 87
End: 2022-10-21

## 2022-10-21 ENCOUNTER — IMAGING SERVICES (OUTPATIENT)
Dept: GENERAL RADIOLOGY | Age: 87
End: 2022-10-21

## 2022-10-21 VITALS
HEART RATE: 78 BPM | WEIGHT: 135 LBS | TEMPERATURE: 97.7 F | RESPIRATION RATE: 18 BRPM | DIASTOLIC BLOOD PRESSURE: 80 MMHG | BODY MASS INDEX: 20.46 KG/M2 | HEIGHT: 68 IN | OXYGEN SATURATION: 98 % | SYSTOLIC BLOOD PRESSURE: 133 MMHG

## 2022-10-21 DIAGNOSIS — R26.9 GAIT ABNORMALITY: ICD-10-CM
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
## 2022-10-21 DIAGNOSIS — M54.50 ACUTE MIDLINE LOW BACK PAIN WITHOUT SCIATICA: ICD-10-CM

## 2022-10-21 DIAGNOSIS — D46.9 MDS (MYELODYSPLASTIC SYNDROME) (CMD): ICD-10-CM

## 2022-10-21 PROCEDURE — 99213 OFFICE O/P EST LOW 20 MIN: CPT | Performed by: INTERNAL MEDICINE

## 2022-10-21 PROCEDURE — 72100 X-RAY EXAM L-S SPINE 2/3 VWS: CPT | Performed by: RADIOLOGY

## 2022-10-21 ASSESSMENT — ENCOUNTER SYMPTOMS
BACK PAIN: 1
GASTROINTESTINAL NEGATIVE: 1
COLOR CHANGE: 1
RESPIRATORY NEGATIVE: 1
ENDOCRINE NEGATIVE: 1
PSYCHIATRIC NEGATIVE: 1
CONSTITUTIONAL NEGATIVE: 1
EYES NEGATIVE: 1

## 2022-10-21 ASSESSMENT — PATIENT HEALTH QUESTIONNAIRE - PHQ9
SUM OF ALL RESPONSES TO PHQ9 QUESTIONS 1 AND 2: 0
2. FEELING DOWN, DEPRESSED OR HOPELESS: NOT AT ALL
1. LITTLE INTEREST OR PLEASURE IN DOING THINGS: NOT AT ALL
SUM OF ALL RESPONSES TO PHQ9 QUESTIONS 1 AND 2: 0
CLINICAL INTERPRETATION OF PHQ2 SCORE: NO FURTHER SCREENING NEEDED

## 2022-10-21 ASSESSMENT — PAIN SCALES - GENERAL: PAINLEVEL: 8

## 2022-10-25 ENCOUNTER — TELEPHONE (OUTPATIENT)
Dept: SCHEDULING | Age: 87
End: 2022-10-25

## 2022-10-27 ENCOUNTER — TELEPHONE (OUTPATIENT)
Dept: SCHEDULING | Age: 87
End: 2022-10-27

## 2022-10-29 ENCOUNTER — TELEPHONE (OUTPATIENT)
Dept: INTERNAL MEDICINE | Age: 87
End: 2022-10-29

## 2022-11-16 DIAGNOSIS — F33.1 MAJOR DEPRESSIVE DISORDER, RECURRENT EPISODE, MODERATE (CMD): ICD-10-CM

## 2022-11-17 RX ORDER — ALPRAZOLAM 0.25 MG/1
TABLET ORAL
Qty: 270 TABLET | Refills: 0 | Status: SHIPPED | OUTPATIENT
Start: 2022-11-17 | End: 2023-04-11

## 2022-12-09 ENCOUNTER — OFFICE VISIT (OUTPATIENT)
Dept: OTOLARYNGOLOGY | Facility: CLINIC | Age: 87
End: 2022-12-09
Payer: MEDICARE

## 2022-12-09 ENCOUNTER — OFFICE VISIT (OUTPATIENT)
Dept: AUDIOLOGY | Facility: CLINIC | Age: 87
End: 2022-12-09
Payer: MEDICARE

## 2022-12-09 VITALS — WEIGHT: 140 LBS | HEIGHT: 68 IN | TEMPERATURE: 98 F | BODY MASS INDEX: 21.22 KG/M2

## 2022-12-09 DIAGNOSIS — H90.3 SENSORINEURAL HEARING LOSS, BILATERAL: Primary | ICD-10-CM

## 2022-12-09 DIAGNOSIS — H61.23 BILATERAL IMPACTED CERUMEN: Primary | ICD-10-CM

## 2022-12-16 ENCOUNTER — AUDIOLOGY DOCUMENTATION (OUTPATIENT)
Dept: AUDIOLOGY | Facility: CLINIC | Age: 87
End: 2022-12-16

## 2023-03-13 ENCOUNTER — APPOINTMENT (OUTPATIENT)
Dept: INTERNAL MEDICINE | Age: 88
End: 2023-03-13

## 2023-03-24 ENCOUNTER — APPOINTMENT (OUTPATIENT)
Dept: INTERNAL MEDICINE | Age: 88
End: 2023-03-24

## 2023-03-28 ENCOUNTER — EXTERNAL RECORD (OUTPATIENT)
Dept: HEALTH INFORMATION MANAGEMENT | Facility: OTHER | Age: 88
End: 2023-03-28

## 2023-04-11 DIAGNOSIS — F33.1 MAJOR DEPRESSIVE DISORDER, RECURRENT EPISODE, MODERATE (CMD): ICD-10-CM

## 2023-04-11 RX ORDER — ALPRAZOLAM 0.25 MG/1
TABLET ORAL
Qty: 270 TABLET | Refills: 0 | Status: SHIPPED | OUTPATIENT
Start: 2023-04-11 | End: 2023-07-27

## 2023-05-12 ENCOUNTER — OFFICE VISIT (OUTPATIENT)
Dept: INTERNAL MEDICINE | Age: 88
End: 2023-05-12

## 2023-05-12 ENCOUNTER — LAB SERVICES (OUTPATIENT)
Dept: LAB | Age: 88
End: 2023-05-12

## 2023-05-12 VITALS
BODY MASS INDEX: 19.73 KG/M2 | HEART RATE: 73 BPM | OXYGEN SATURATION: 100 % | DIASTOLIC BLOOD PRESSURE: 56 MMHG | HEIGHT: 69 IN | TEMPERATURE: 97.2 F | SYSTOLIC BLOOD PRESSURE: 130 MMHG | RESPIRATION RATE: 20 BRPM | WEIGHT: 133.2 LBS

## 2023-05-12 DIAGNOSIS — N21.0 BLADDER STONES: ICD-10-CM

## 2023-05-12 DIAGNOSIS — Z00.00 HEALTHCARE MAINTENANCE: ICD-10-CM

## 2023-05-12 DIAGNOSIS — D64.9 ANEMIA, UNSPECIFIED TYPE: ICD-10-CM

## 2023-05-12 DIAGNOSIS — D46.9 MDS (MYELODYSPLASTIC SYNDROME) (CMD): ICD-10-CM

## 2023-05-12 DIAGNOSIS — L84 CALLUS BETWEEN TOES: ICD-10-CM

## 2023-05-12 DIAGNOSIS — K40.90 NON-RECURRENT UNILATERAL INGUINAL HERNIA WITHOUT OBSTRUCTION OR GANGRENE: ICD-10-CM

## 2023-05-12 DIAGNOSIS — Z98.61 CAD S/P PERCUTANEOUS CORONARY ANGIOPLASTY: ICD-10-CM

## 2023-05-12 DIAGNOSIS — F33.1 MAJOR DEPRESSIVE DISORDER, RECURRENT EPISODE, MODERATE (CMD): ICD-10-CM

## 2023-05-12 DIAGNOSIS — Z00.00 PHYSICAL EXAM, ROUTINE: Primary | ICD-10-CM

## 2023-05-12 DIAGNOSIS — R63.4 WEIGHT LOSS: ICD-10-CM

## 2023-05-12 DIAGNOSIS — E78.00 PURE HYPERCHOLESTEROLEMIA: ICD-10-CM

## 2023-05-12 DIAGNOSIS — K21.9 CHRONIC GERD: ICD-10-CM

## 2023-05-12 DIAGNOSIS — I25.10 CAD S/P PERCUTANEOUS CORONARY ANGIOPLASTY: ICD-10-CM

## 2023-05-12 LAB
BASOPHILS # BLD: 0 K/MCL (ref 0–0.3)
BASOPHILS NFR BLD: 1 %
DEPRECATED RDW RBC: 56.2 FL (ref 39–50)
EOSINOPHIL # BLD: 0 K/MCL (ref 0–0.5)
EOSINOPHIL NFR BLD: 0 %
ERYTHROCYTE [DISTWIDTH] IN BLOOD: 16 % (ref 11–15)
FERRITIN SERPL-MCNC: 15 NG/ML (ref 26–388)
FOLATE SERPL-MCNC: 19.8 NG/ML
HCT VFR BLD CALC: 31.1 % (ref 39–51)
HGB BLD-MCNC: 9.5 G/DL (ref 13–17)
IMM GRANULOCYTES # BLD AUTO: 0 K/MCL (ref 0–0.2)
IMM GRANULOCYTES # BLD: 0 %
IRON SATN MFR SERPL: 8 % (ref 15–45)
IRON SERPL-MCNC: 31 MCG/DL (ref 65–175)
LYMPHOCYTES # BLD: 0.6 K/MCL (ref 1–4)
LYMPHOCYTES NFR BLD: 16 %
MCH RBC QN AUTO: 29.1 PG (ref 26–34)
MCHC RBC AUTO-ENTMCNC: 30.5 G/DL (ref 32–36.5)
MCV RBC AUTO: 95.4 FL (ref 78–100)
MONOCYTES # BLD: 0.2 K/MCL (ref 0.3–0.9)
MONOCYTES NFR BLD: 6 %
NEUTROPHILS # BLD: 2.7 K/MCL (ref 1.8–7.7)
NEUTROPHILS NFR BLD: 77 %
NRBC BLD MANUAL-RTO: 0 /100 WBC
PLATELET # BLD AUTO: 204 K/MCL (ref 140–450)
RBC # BLD: 3.26 MIL/MCL (ref 4.5–5.9)
TIBC SERPL-MCNC: 401 MCG/DL (ref 250–450)
VIT B12 SERPL-MCNC: >2000 PG/ML (ref 211–911)
WBC # BLD: 3.5 K/MCL (ref 4.2–11)

## 2023-05-12 PROCEDURE — 82746 ASSAY OF FOLIC ACID SERUM: CPT | Performed by: INTERNAL MEDICINE

## 2023-05-12 PROCEDURE — 82728 ASSAY OF FERRITIN: CPT | Performed by: INTERNAL MEDICINE

## 2023-05-12 PROCEDURE — 85025 COMPLETE CBC W/AUTO DIFF WBC: CPT | Performed by: INTERNAL MEDICINE

## 2023-05-12 PROCEDURE — 82607 VITAMIN B-12: CPT | Performed by: INTERNAL MEDICINE

## 2023-05-12 PROCEDURE — 83540 ASSAY OF IRON: CPT | Performed by: INTERNAL MEDICINE

## 2023-05-12 PROCEDURE — 96372 THER/PROPH/DIAG INJ SC/IM: CPT | Performed by: INTERNAL MEDICINE

## 2023-05-12 PROCEDURE — 83550 IRON BINDING TEST: CPT | Performed by: INTERNAL MEDICINE

## 2023-05-12 PROCEDURE — 36415 COLL VENOUS BLD VENIPUNCTURE: CPT | Performed by: INTERNAL MEDICINE

## 2023-05-12 PROCEDURE — 99213 OFFICE O/P EST LOW 20 MIN: CPT | Performed by: INTERNAL MEDICINE

## 2023-05-12 PROCEDURE — G0439 PPPS, SUBSEQ VISIT: HCPCS | Performed by: INTERNAL MEDICINE

## 2023-05-12 RX ORDER — CYANOCOBALAMIN 1000 UG/ML
1000 INJECTION, SOLUTION INTRAMUSCULAR; SUBCUTANEOUS ONCE
Status: COMPLETED | OUTPATIENT
Start: 2023-05-12 | End: 2023-05-12

## 2023-05-12 RX ADMIN — CYANOCOBALAMIN 1000 MCG: 1000 INJECTION, SOLUTION INTRAMUSCULAR; SUBCUTANEOUS at 12:21

## 2023-05-12 ASSESSMENT — MINI COG
TOTAL SCORE: 3
PATIENT ABLE TO FILL IN THE CLOCK FACE WITH 10 MINUTES PAST 11 O'CLOCK?: NO, CLOCK IS NOT CORRECT
PATIENT WAS GIVEN REPEAT BACK WORDS FROM VERSION: 1 - BANANA SUNRISE CHAIR
PATIENT ABLE TO REPEAT THE 3 WORDS GIVEN PREVIOUSLY?: WAS ABLE TO REPEAT BACK 3 WORDS CORRECTLY

## 2023-05-12 ASSESSMENT — ENCOUNTER SYMPTOMS
UNEXPECTED WEIGHT CHANGE: 1
ENDOCRINE NEGATIVE: 1
EYES NEGATIVE: 1
NEUROLOGICAL NEGATIVE: 1
RESPIRATORY NEGATIVE: 1
BACK PAIN: 1
AGITATION: 1
GASTROINTESTINAL NEGATIVE: 1

## 2023-05-12 ASSESSMENT — PATIENT HEALTH QUESTIONNAIRE - PHQ9
SUM OF ALL RESPONSES TO PHQ9 QUESTIONS 1 AND 2: 2
CLINICAL INTERPRETATION OF PHQ2 SCORE: NO FURTHER SCREENING NEEDED
SUM OF ALL RESPONSES TO PHQ9 QUESTIONS 1 AND 2: 2
1. LITTLE INTEREST OR PLEASURE IN DOING THINGS: SEVERAL DAYS
2. FEELING DOWN, DEPRESSED OR HOPELESS: SEVERAL DAYS

## 2023-05-15 ENCOUNTER — TELEPHONE (OUTPATIENT)
Dept: INTERNAL MEDICINE | Age: 88
End: 2023-05-15

## 2023-05-16 ENCOUNTER — EXTERNAL RECORD (OUTPATIENT)
Dept: HEALTH INFORMATION MANAGEMENT | Facility: OTHER | Age: 88
End: 2023-05-16

## 2023-07-24 ENCOUNTER — EXTERNAL RECORD (OUTPATIENT)
Dept: HEALTH INFORMATION MANAGEMENT | Facility: OTHER | Age: 88
End: 2023-07-24

## 2023-07-26 DIAGNOSIS — F33.1 MAJOR DEPRESSIVE DISORDER, RECURRENT EPISODE, MODERATE (CMD): ICD-10-CM

## 2023-07-27 RX ORDER — ALPRAZOLAM 0.25 MG/1
TABLET ORAL
Qty: 270 TABLET | Refills: 0 | Status: SHIPPED | OUTPATIENT
Start: 2023-07-27

## 2023-12-07 DIAGNOSIS — F33.1 MAJOR DEPRESSIVE DISORDER, RECURRENT EPISODE, MODERATE (CMD): ICD-10-CM

## 2023-12-07 RX ORDER — ALPRAZOLAM 0.25 MG/1
TABLET ORAL
Qty: 270 TABLET | Refills: 0 | OUTPATIENT
Start: 2023-12-07

## 2023-12-19 ENCOUNTER — NURSE TRIAGE (OUTPATIENT)
Dept: TELEHEALTH | Age: 88
End: 2023-12-19

## 2024-01-04 ENCOUNTER — TELEPHONE (OUTPATIENT)
Dept: INTERNAL MEDICINE | Age: 89
End: 2024-01-04

## 2024-01-12 ENCOUNTER — APPOINTMENT (OUTPATIENT)
Dept: INTERNAL MEDICINE | Age: 89
End: 2024-01-12

## 2024-01-12 DIAGNOSIS — F33.1 MAJOR DEPRESSIVE DISORDER, RECURRENT EPISODE, MODERATE (CMD): ICD-10-CM

## 2024-01-12 RX ORDER — ALPRAZOLAM 0.25 MG/1
TABLET ORAL
Qty: 270 TABLET | Refills: 0 | OUTPATIENT
Start: 2024-01-12

## 2024-01-12 RX ORDER — ALPRAZOLAM 0.25 MG/1
0.25 TABLET ORAL 3 TIMES DAILY PRN
Qty: 270 TABLET | Refills: 1 | Status: SHIPPED | OUTPATIENT
Start: 2024-01-12

## 2024-02-23 ENCOUNTER — APPOINTMENT (OUTPATIENT)
Dept: INTERNAL MEDICINE | Age: 89
End: 2024-02-23

## 2024-02-26 ENCOUNTER — APPOINTMENT (OUTPATIENT)
Dept: INTERNAL MEDICINE | Age: 89
End: 2024-02-26

## 2024-02-26 ENCOUNTER — LAB SERVICES (OUTPATIENT)
Dept: LAB | Age: 89
End: 2024-02-26

## 2024-02-26 VITALS
BODY MASS INDEX: 19.55 KG/M2 | HEART RATE: 74 BPM | WEIGHT: 132 LBS | SYSTOLIC BLOOD PRESSURE: 122 MMHG | OXYGEN SATURATION: 99 % | RESPIRATION RATE: 16 BRPM | DIASTOLIC BLOOD PRESSURE: 74 MMHG | TEMPERATURE: 97.6 F | HEIGHT: 69 IN

## 2024-02-26 DIAGNOSIS — D46.9 MDS (MYELODYSPLASTIC SYNDROME) (CMD): ICD-10-CM

## 2024-02-26 DIAGNOSIS — Z98.61 CAD S/P PERCUTANEOUS CORONARY ANGIOPLASTY: ICD-10-CM

## 2024-02-26 DIAGNOSIS — F41.8 DEPRESSION WITH ANXIETY: ICD-10-CM

## 2024-02-26 DIAGNOSIS — R68.89 INTOLERANCE TO COLD: ICD-10-CM

## 2024-02-26 DIAGNOSIS — M54.50 ACUTE MIDLINE LOW BACK PAIN WITHOUT SCIATICA: ICD-10-CM

## 2024-02-26 DIAGNOSIS — I25.10 CAD S/P PERCUTANEOUS CORONARY ANGIOPLASTY: ICD-10-CM

## 2024-02-26 DIAGNOSIS — Z00.00 PHYSICAL EXAM, ROUTINE: Primary | ICD-10-CM

## 2024-02-26 DIAGNOSIS — R10.31 RIGHT LOWER QUADRANT ABDOMINAL PAIN: ICD-10-CM

## 2024-02-26 DIAGNOSIS — Z71.89 ADVANCED DIRECTIVES, COUNSELING/DISCUSSION: ICD-10-CM

## 2024-02-26 DIAGNOSIS — Z00.00 PHYSICAL EXAM, ROUTINE: ICD-10-CM

## 2024-02-26 DIAGNOSIS — F33.1 MAJOR DEPRESSIVE DISORDER, RECURRENT EPISODE, MODERATE (CMD): ICD-10-CM

## 2024-02-26 DIAGNOSIS — E78.00 PURE HYPERCHOLESTEROLEMIA: ICD-10-CM

## 2024-02-26 DIAGNOSIS — H91.93 DECREASED HEARING OF BOTH EARS: ICD-10-CM

## 2024-02-26 PROCEDURE — 87086 URINE CULTURE/COLONY COUNT: CPT | Performed by: CLINICAL MEDICAL LABORATORY

## 2024-02-26 PROCEDURE — 83540 ASSAY OF IRON: CPT | Performed by: CLINICAL MEDICAL LABORATORY

## 2024-02-26 PROCEDURE — 87186 SC STD MICRODIL/AGAR DIL: CPT | Performed by: CLINICAL MEDICAL LABORATORY

## 2024-02-26 PROCEDURE — 36415 COLL VENOUS BLD VENIPUNCTURE: CPT | Performed by: INTERNAL MEDICINE

## 2024-02-26 PROCEDURE — 85025 COMPLETE CBC W/AUTO DIFF WBC: CPT | Performed by: CLINICAL MEDICAL LABORATORY

## 2024-02-26 PROCEDURE — 87077 CULTURE AEROBIC IDENTIFY: CPT | Performed by: CLINICAL MEDICAL LABORATORY

## 2024-02-26 PROCEDURE — 81001 URINALYSIS AUTO W/SCOPE: CPT | Performed by: CLINICAL MEDICAL LABORATORY

## 2024-02-26 ASSESSMENT — PATIENT HEALTH QUESTIONNAIRE - PHQ9
1. LITTLE INTEREST OR PLEASURE IN DOING THINGS: NOT AT ALL
CLINICAL INTERPRETATION OF PHQ2 SCORE: NO FURTHER SCREENING NEEDED
SUM OF ALL RESPONSES TO PHQ9 QUESTIONS 1 AND 2: 0
SUM OF ALL RESPONSES TO PHQ9 QUESTIONS 1 AND 2: 0
2. FEELING DOWN, DEPRESSED OR HOPELESS: NOT AT ALL

## 2024-02-26 ASSESSMENT — COGNITIVE AND FUNCTIONAL STATUS - GENERAL
BECAUSE OF A PHYSICAL, MENTAL, OR EMOTIONAL CONDITION, DO YOU HAVE SERIOUS DIFFICULTY CONCENTRATING, REMEMBERING OR MAKING DECISIONS: NO
BECAUSE OF A PHYSICAL, MENTAL, OR EMOTIONAL CONDITION, DO YOU HAVE DIFFICULTY DOING ERRANDS ALONE: NO
DO YOU HAVE SERIOUS DIFFICULTY WALKING OR CLIMBING STAIRS: NO
DO YOU HAVE DIFFICULTY DRESSING OR BATHING: NO

## 2024-02-27 LAB
ALBUMIN SERPL-MCNC: 4.2 G/DL (ref 3.6–5.1)
ALBUMIN/GLOB SERPL: 1.2 {RATIO} (ref 1–2.4)
ALP SERPL-CCNC: 78 UNITS/L (ref 45–117)
ALT SERPL-CCNC: 19 UNITS/L
AMORPH SED URNS QL MICRO: PRESENT
ANION GAP SERPL CALC-SCNC: 14 MMOL/L (ref 7–19)
APPEARANCE UR: CLEAR
AST SERPL-CCNC: 20 UNITS/L
BACTERIA #/AREA URNS HPF: ABNORMAL /HPF
BASOPHILS # BLD: 0 K/MCL (ref 0–0.3)
BASOPHILS NFR BLD: 1 %
BILIRUB SERPL-MCNC: 0.5 MG/DL (ref 0.2–1)
BILIRUB UR QL STRIP: NEGATIVE
BUN SERPL-MCNC: 24 MG/DL (ref 6–20)
BUN/CREAT SERPL: 26 (ref 7–25)
CALCIUM SERPL-MCNC: 9.6 MG/DL (ref 8.4–10.2)
CHLORIDE SERPL-SCNC: 109 MMOL/L (ref 97–110)
CHOLEST SERPL-MCNC: 97 MG/DL
CHOLEST/HDLC SERPL: 2.5 {RATIO}
CO2 SERPL-SCNC: 20 MMOL/L (ref 21–32)
COLOR UR: YELLOW
CREAT SERPL-MCNC: 0.92 MG/DL (ref 0.67–1.17)
DEPRECATED RDW RBC: 74.4 FL (ref 39–50)
EGFRCR SERPLBLD CKD-EPI 2021: 81 ML/MIN/{1.73_M2}
EOSINOPHIL # BLD: 0.1 K/MCL (ref 0–0.5)
EOSINOPHIL NFR BLD: 1 %
ERYTHROCYTE [DISTWIDTH] IN BLOOD: 25.1 % (ref 11–15)
FASTING DURATION TIME PATIENT: ABNORMAL H
GLOBULIN SER-MCNC: 3.4 G/DL (ref 2–4)
GLUCOSE SERPL-MCNC: 92 MG/DL (ref 70–99)
GLUCOSE UR STRIP-MCNC: NEGATIVE MG/DL
HCT VFR BLD CALC: 26.2 % (ref 39–51)
HDLC SERPL-MCNC: 39 MG/DL
HGB BLD-MCNC: 7.2 G/DL (ref 13–17)
HGB UR QL STRIP: NEGATIVE
HYALINE CASTS #/AREA URNS LPF: ABNORMAL /LPF
IMM GRANULOCYTES # BLD AUTO: 0 K/MCL (ref 0–0.2)
IMM GRANULOCYTES # BLD: 0 %
IRON SERPL-MCNC: 26 MCG/DL (ref 65–175)
KETONES UR STRIP-MCNC: NEGATIVE MG/DL
LDLC SERPL CALC-MCNC: 40 MG/DL
LEUKOCYTE ESTERASE UR QL STRIP: ABNORMAL
LYMPHOCYTES # BLD: 0.9 K/MCL (ref 1–4)
LYMPHOCYTES NFR BLD: 21 %
MCH RBC QN AUTO: 22.8 PG (ref 26–34)
MCHC RBC AUTO-ENTMCNC: 27.5 G/DL (ref 32–36.5)
MCV RBC AUTO: 82.9 FL (ref 78–100)
MONOCYTES # BLD: 0.3 K/MCL (ref 0.3–0.9)
MONOCYTES NFR BLD: 8 %
MUCOUS THREADS URNS QL MICRO: PRESENT
NEUTROPHILS # BLD: 3 K/MCL (ref 1.8–7.7)
NEUTROPHILS NFR BLD: 69 %
NITRITE UR QL STRIP: NEGATIVE
NONHDLC SERPL-MCNC: 58 MG/DL
NRBC BLD MANUAL-RTO: 0 /100 WBC
PH UR STRIP: 7 [PH] (ref 5–7)
PLATELET # BLD AUTO: 214 K/MCL (ref 140–450)
POTASSIUM SERPL-SCNC: 4.2 MMOL/L (ref 3.4–5.1)
PROT SERPL-MCNC: 7.6 G/DL (ref 6.4–8.2)
PROT UR STRIP-MCNC: NEGATIVE MG/DL
RBC # BLD: 3.16 MIL/MCL (ref 4.5–5.9)
RBC #/AREA URNS HPF: ABNORMAL /HPF
SODIUM SERPL-SCNC: 139 MMOL/L (ref 135–145)
SP GR UR STRIP: 1.01 (ref 1–1.03)
SQUAMOUS #/AREA URNS HPF: ABNORMAL /HPF
TRIGL SERPL-MCNC: 90 MG/DL
UROBILINOGEN UR STRIP-MCNC: 0.2 MG/DL
WBC # BLD: 4.3 K/MCL (ref 4.2–11)
WBC #/AREA URNS HPF: ABNORMAL /HPF

## 2024-02-27 ASSESSMENT — ENCOUNTER SYMPTOMS
NEUROLOGICAL NEGATIVE: 1
BACK PAIN: 1
EYES NEGATIVE: 1
ROS SKIN COMMENTS: DRY SKIN
AGITATION: 1
CONSTITUTIONAL NEGATIVE: 1
RESPIRATORY NEGATIVE: 1
ENDOCRINE NEGATIVE: 1
CONSTIPATION: 1

## 2024-02-28 ENCOUNTER — TELEPHONE (OUTPATIENT)
Dept: INTERNAL MEDICINE | Age: 89
End: 2024-02-28

## 2024-02-28 LAB — BACTERIA UR CULT: ABNORMAL

## 2024-02-29 ENCOUNTER — TELEPHONE (OUTPATIENT)
Dept: INTERNAL MEDICINE | Age: 89
End: 2024-02-29

## 2024-02-29 RX ORDER — CIPROFLOXACIN 500 MG/1
500 TABLET, FILM COATED ORAL 2 TIMES DAILY
Qty: 10 TABLET | Refills: 0 | Status: SHIPPED | OUTPATIENT
Start: 2024-02-29

## 2024-04-08 ENCOUNTER — TELEPHONE (OUTPATIENT)
Dept: INTERNAL MEDICINE | Age: 89
End: 2024-04-08

## 2024-04-19 ENCOUNTER — LAB SERVICES (OUTPATIENT)
Dept: LAB | Age: 89
End: 2024-04-19

## 2024-04-19 ENCOUNTER — APPOINTMENT (OUTPATIENT)
Dept: INTERNAL MEDICINE | Age: 89
End: 2024-04-19

## 2024-04-19 VITALS
HEIGHT: 69 IN | TEMPERATURE: 97.3 F | DIASTOLIC BLOOD PRESSURE: 50 MMHG | HEART RATE: 56 BPM | SYSTOLIC BLOOD PRESSURE: 119 MMHG | OXYGEN SATURATION: 99 % | WEIGHT: 133.44 LBS | BODY MASS INDEX: 19.76 KG/M2 | RESPIRATION RATE: 16 BRPM

## 2024-04-19 DIAGNOSIS — Z00.00 PHYSICAL EXAM, ROUTINE: ICD-10-CM

## 2024-04-19 DIAGNOSIS — D46.9 MDS (MYELODYSPLASTIC SYNDROME) (CMD): Primary | ICD-10-CM

## 2024-04-19 DIAGNOSIS — D64.9 ANEMIA, UNSPECIFIED TYPE: ICD-10-CM

## 2024-04-19 DIAGNOSIS — F41.8 DEPRESSION WITH ANXIETY: ICD-10-CM

## 2024-04-19 DIAGNOSIS — R63.4 WEIGHT LOSS: ICD-10-CM

## 2024-04-19 DIAGNOSIS — F33.1 MAJOR DEPRESSIVE DISORDER, RECURRENT EPISODE, MODERATE (CMD): ICD-10-CM

## 2024-04-19 DIAGNOSIS — Z71.3 NUTRITIONAL COUNSELING: ICD-10-CM

## 2024-04-19 DIAGNOSIS — Z98.61 CAD S/P PERCUTANEOUS CORONARY ANGIOPLASTY: ICD-10-CM

## 2024-04-19 DIAGNOSIS — I25.10 CAD S/P PERCUTANEOUS CORONARY ANGIOPLASTY: ICD-10-CM

## 2024-04-19 PROCEDURE — 85025 COMPLETE CBC W/AUTO DIFF WBC: CPT | Performed by: CLINICAL MEDICAL LABORATORY

## 2024-04-19 PROCEDURE — 81003 URINALYSIS AUTO W/O SCOPE: CPT | Performed by: CLINICAL MEDICAL LABORATORY

## 2024-04-19 PROCEDURE — 36415 COLL VENOUS BLD VENIPUNCTURE: CPT | Performed by: INTERNAL MEDICINE

## 2024-04-19 PROCEDURE — 83540 ASSAY OF IRON: CPT | Performed by: CLINICAL MEDICAL LABORATORY

## 2024-04-19 RX ORDER — MEGESTROL ACETATE 40 MG/ML
200 SUSPENSION ORAL DAILY
Qty: 150 ML | Refills: 1 | Status: SHIPPED | OUTPATIENT
Start: 2024-04-19

## 2024-04-19 ASSESSMENT — ENCOUNTER SYMPTOMS
GASTROINTESTINAL NEGATIVE: 1
EYES NEGATIVE: 1
FATIGUE: 1
RESPIRATORY NEGATIVE: 1
NEUROLOGICAL NEGATIVE: 1
ENDOCRINE NEGATIVE: 1

## 2024-04-19 ASSESSMENT — PATIENT HEALTH QUESTIONNAIRE - PHQ9
1. LITTLE INTEREST OR PLEASURE IN DOING THINGS: SEVERAL DAYS
SUM OF ALL RESPONSES TO PHQ9 QUESTIONS 1 AND 2: 2
2. FEELING DOWN, DEPRESSED OR HOPELESS: SEVERAL DAYS
CLINICAL INTERPRETATION OF PHQ2 SCORE: NO FURTHER SCREENING NEEDED
SUM OF ALL RESPONSES TO PHQ9 QUESTIONS 1 AND 2: 2

## 2024-04-20 LAB
APPEARANCE UR: CLEAR
BASOPHILS # BLD: 0 K/MCL (ref 0–0.3)
BASOPHILS NFR BLD: 1 %
BILIRUB UR QL STRIP: NEGATIVE
COLOR UR: NORMAL
DEPRECATED RDW RBC: 78.8 FL (ref 39–50)
EOSINOPHIL # BLD: 0.1 K/MCL (ref 0–0.5)
EOSINOPHIL NFR BLD: 2 %
ERYTHROCYTE [DISTWIDTH] IN BLOOD: 24.6 % (ref 11–15)
GLUCOSE UR STRIP-MCNC: NEGATIVE MG/DL
HCT VFR BLD CALC: 30.5 % (ref 39–51)
HGB BLD-MCNC: 8.9 G/DL (ref 13–17)
HGB UR QL STRIP: NEGATIVE
IMM GRANULOCYTES # BLD AUTO: 0 K/MCL (ref 0–0.2)
IMM GRANULOCYTES # BLD: 1 %
IRON SERPL-MCNC: 35 MCG/DL (ref 65–175)
KETONES UR STRIP-MCNC: NEGATIVE MG/DL
LEUKOCYTE ESTERASE UR QL STRIP: NEGATIVE
LYMPHOCYTES # BLD: 0.8 K/MCL (ref 1–4)
LYMPHOCYTES NFR BLD: 19 %
MCH RBC QN AUTO: 25.9 PG (ref 26–34)
MCHC RBC AUTO-ENTMCNC: 29.2 G/DL (ref 32–36.5)
MCV RBC AUTO: 88.9 FL (ref 78–100)
MONOCYTES # BLD: 0.4 K/MCL (ref 0.3–0.9)
MONOCYTES NFR BLD: 9 %
NEUTROPHILS # BLD: 3 K/MCL (ref 1.8–7.7)
NEUTROPHILS NFR BLD: 68 %
NITRITE UR QL STRIP: NEGATIVE
NRBC BLD MANUAL-RTO: 0 /100 WBC
PH UR STRIP: 5.5 [PH] (ref 5–7)
PLATELET # BLD AUTO: 217 K/MCL (ref 140–450)
PROT UR STRIP-MCNC: NEGATIVE MG/DL
RBC # BLD: 3.43 MIL/MCL (ref 4.5–5.9)
SP GR UR STRIP: 1.02 (ref 1–1.03)
UROBILINOGEN UR STRIP-MCNC: 0.2 MG/DL
WBC # BLD: 4.3 K/MCL (ref 4.2–11)

## 2024-06-03 ENCOUNTER — TELEPHONE (OUTPATIENT)
Dept: INTERNAL MEDICINE | Age: 89
End: 2024-06-03

## 2024-06-12 ENCOUNTER — TELEPHONE (OUTPATIENT)
Dept: INTERNAL MEDICINE | Age: 89
End: 2024-06-12

## 2024-07-21 DIAGNOSIS — F33.1 MAJOR DEPRESSIVE DISORDER, RECURRENT EPISODE, MODERATE  (CMD): ICD-10-CM

## 2024-07-22 ENCOUNTER — TELEPHONE (OUTPATIENT)
Dept: INTERNAL MEDICINE | Age: 89
End: 2024-07-22

## 2024-07-22 DIAGNOSIS — F33.1 MAJOR DEPRESSIVE DISORDER, RECURRENT EPISODE, MODERATE  (CMD): ICD-10-CM

## 2024-07-22 RX ORDER — ALPRAZOLAM 0.25 MG/1
0.25 TABLET ORAL 3 TIMES DAILY PRN
Qty: 270 TABLET | Refills: 0 | OUTPATIENT
Start: 2024-07-22

## 2024-07-23 RX ORDER — ALPRAZOLAM 0.25 MG/1
0.25 TABLET ORAL 3 TIMES DAILY PRN
Qty: 270 TABLET | Refills: 1 | Status: SHIPPED | OUTPATIENT
Start: 2024-07-23

## 2024-11-19 ENCOUNTER — NURSE TRIAGE (OUTPATIENT)
Dept: TELEHEALTH | Age: 89
End: 2024-11-19

## 2024-11-20 ENCOUNTER — TELEPHONE (OUTPATIENT)
Dept: INTERNAL MEDICINE | Age: 89
End: 2024-11-20

## 2024-11-22 ENCOUNTER — OFFICE VISIT (OUTPATIENT)
Dept: INTERNAL MEDICINE | Age: 89
End: 2024-11-22

## 2024-11-22 ENCOUNTER — IMAGING SERVICES (OUTPATIENT)
Dept: GENERAL RADIOLOGY | Age: 89
End: 2024-11-22
Attending: INTERNAL MEDICINE

## 2024-11-22 VITALS
WEIGHT: 133 LBS | BODY MASS INDEX: 19.7 KG/M2 | HEIGHT: 69 IN | HEART RATE: 57 BPM | OXYGEN SATURATION: 99 % | SYSTOLIC BLOOD PRESSURE: 107 MMHG | DIASTOLIC BLOOD PRESSURE: 63 MMHG | RESPIRATION RATE: 18 BRPM

## 2024-11-22 DIAGNOSIS — F41.8 DEPRESSION WITH ANXIETY: ICD-10-CM
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
## 2024-11-22 DIAGNOSIS — Z98.61 CAD S/P PERCUTANEOUS CORONARY ANGIOPLASTY: ICD-10-CM
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
## 2024-11-22 DIAGNOSIS — S61.501D OPEN WOUND OF RIGHT WRIST, SUBSEQUENT ENCOUNTER: ICD-10-CM

## 2024-11-22 DIAGNOSIS — D64.9 ANEMIA, UNSPECIFIED TYPE: ICD-10-CM

## 2024-11-22 DIAGNOSIS — S22.41XD CLOSED FRACTURE OF MULTIPLE RIBS OF RIGHT SIDE WITH ROUTINE HEALING: ICD-10-CM

## 2024-11-22 DIAGNOSIS — I25.10 CAD S/P PERCUTANEOUS CORONARY ANGIOPLASTY: ICD-10-CM

## 2024-11-22 DIAGNOSIS — F33.1 MAJOR DEPRESSIVE DISORDER, RECURRENT EPISODE, MODERATE (CMD): ICD-10-CM

## 2024-11-22 PROBLEM — S61.501A OPEN WOUND OF RIGHT WRIST: Status: ACTIVE | Noted: 2024-11-22

## 2024-11-22 PROCEDURE — 71100 X-RAY EXAM RIBS UNI 2 VIEWS: CPT | Performed by: RADIOLOGY

## 2024-11-22 ASSESSMENT — ENCOUNTER SYMPTOMS
ENDOCRINE NEGATIVE: 1
BACK PAIN: 1
RESPIRATORY NEGATIVE: 1
WOUND: 1
NEUROLOGICAL NEGATIVE: 1
EYES NEGATIVE: 1
CONSTITUTIONAL NEGATIVE: 1
AGITATION: 1
GASTROINTESTINAL NEGATIVE: 1

## 2024-11-25 ENCOUNTER — TELEPHONE (OUTPATIENT)
Dept: INTERNAL MEDICINE | Age: 89
End: 2024-11-25

## 2024-11-30 ENCOUNTER — TELEPHONE (OUTPATIENT)
Dept: INTERNAL MEDICINE | Age: 89
End: 2024-11-30

## 2025-06-02 ENCOUNTER — TELEPHONE (OUTPATIENT)
Dept: INTERNAL MEDICINE | Age: 89
End: 2025-06-02

## 2025-06-02 ENCOUNTER — APPOINTMENT (OUTPATIENT)
Dept: INTERNAL MEDICINE | Age: 89
End: 2025-06-02

## 2025-08-04 ENCOUNTER — LAB SERVICES (OUTPATIENT)
Dept: LAB | Age: 89
End: 2025-08-04

## 2025-08-04 ENCOUNTER — APPOINTMENT (OUTPATIENT)
Dept: INTERNAL MEDICINE | Age: 89
End: 2025-08-04

## 2025-08-04 VITALS
DIASTOLIC BLOOD PRESSURE: 57 MMHG | RESPIRATION RATE: 16 BRPM | SYSTOLIC BLOOD PRESSURE: 127 MMHG | BODY MASS INDEX: 19.11 KG/M2 | TEMPERATURE: 97.5 F | WEIGHT: 129 LBS | OXYGEN SATURATION: 98 % | HEART RATE: 56 BPM | HEIGHT: 69 IN

## 2025-08-04 DIAGNOSIS — Z00.00 HEALTHCARE MAINTENANCE: ICD-10-CM

## 2025-08-04 DIAGNOSIS — F33.1 MAJOR DEPRESSIVE DISORDER, RECURRENT EPISODE, MODERATE (CMD): ICD-10-CM

## 2025-08-04 DIAGNOSIS — D46.9 MDS (MYELODYSPLASTIC SYNDROME)  (CMD): ICD-10-CM

## 2025-08-04 DIAGNOSIS — Z00.00 HEALTHCARE MAINTENANCE: Primary | ICD-10-CM

## 2025-08-04 DIAGNOSIS — Z98.61 CAD S/P PERCUTANEOUS CORONARY ANGIOPLASTY: ICD-10-CM
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
## 2025-08-04 DIAGNOSIS — F41.8 DEPRESSION WITH ANXIETY: ICD-10-CM

## 2025-08-04 DIAGNOSIS — I25.10 CAD S/P PERCUTANEOUS CORONARY ANGIOPLASTY: ICD-10-CM

## 2025-08-04 DIAGNOSIS — R26.9 GAIT ABNORMALITY: ICD-10-CM

## 2025-08-04 DIAGNOSIS — Z71.89 ADVANCED DIRECTIVES, COUNSELING/DISCUSSION: ICD-10-CM

## 2025-08-04 DIAGNOSIS — E78.00 PURE HYPERCHOLESTEROLEMIA: ICD-10-CM

## 2025-08-04 DIAGNOSIS — D64.9 ANEMIA, UNSPECIFIED TYPE: ICD-10-CM

## 2025-08-04 DIAGNOSIS — S61.501D OPEN WOUND OF RIGHT WRIST, SUBSEQUENT ENCOUNTER: ICD-10-CM

## 2025-08-04 DIAGNOSIS — M54.50 ACUTE MIDLINE LOW BACK PAIN WITHOUT SCIATICA: ICD-10-CM

## 2025-08-04 DIAGNOSIS — K21.9 CHRONIC GERD: ICD-10-CM

## 2025-08-04 DIAGNOSIS — Z71.3 NUTRITIONAL COUNSELING: ICD-10-CM

## 2025-08-04 RX ORDER — ATORVASTATIN CALCIUM 20 MG/1
20 TABLET, FILM COATED ORAL DAILY
Qty: 90 TABLET | Refills: 3 | Status: SHIPPED | OUTPATIENT
Start: 2025-08-04

## 2025-08-04 RX ORDER — ALPRAZOLAM 0.25 MG
0.25 TABLET ORAL 3 TIMES DAILY PRN
Qty: 270 TABLET | Refills: 1 | Status: SHIPPED | OUTPATIENT
Start: 2025-08-04

## 2025-08-04 ASSESSMENT — ENCOUNTER SYMPTOMS
WEAKNESS: 1
ENDOCRINE NEGATIVE: 1
RESPIRATORY NEGATIVE: 1
BACK PAIN: 1
SLEEP DISTURBANCE: 1
EYES NEGATIVE: 1
CONSTITUTIONAL NEGATIVE: 1
NERVOUS/ANXIOUS: 1
CONSTIPATION: 1
WOUND: 1
AGITATION: 1

## 2025-08-04 ASSESSMENT — MINI COG
TOTAL SCORE: 4
PATIENT ABLE TO REPEAT THE 3 WORDS GIVEN PREVIOUSLY?: WAS ABLE TO REPEAT BACK 2 WORDS CORRECTLY
PATIENT WAS GIVEN REPEAT BACK WORDS FROM VERSION: 3 - VILLAGE KITCHEN BABY
PATIENT ABLE TO FILL IN THE CLOCK FACE WITH 10 MINUTES PAST 11 O'CLOCK?: YES, CLOCK IS CORRECT

## 2025-08-04 ASSESSMENT — COGNITIVE AND FUNCTIONAL STATUS - GENERAL
BECAUSE OF A PHYSICAL, MENTAL, OR EMOTIONAL CONDITION, DO YOU HAVE SERIOUS DIFFICULTY CONCENTRATING, REMEMBERING OR MAKING DECISIONS: NO
DO YOU HAVE SERIOUS DIFFICULTY WALKING OR CLIMBING STAIRS: NO
DO YOU HAVE DIFFICULTY DRESSING OR BATHING: NO
BECAUSE OF A PHYSICAL, MENTAL, OR EMOTIONAL CONDITION, DO YOU HAVE DIFFICULTY DOING ERRANDS ALONE: NO

## 2025-08-04 ASSESSMENT — PATIENT HEALTH QUESTIONNAIRE - PHQ9
SUM OF ALL RESPONSES TO PHQ9 QUESTIONS 1 AND 2: 0
2. FEELING DOWN, DEPRESSED OR HOPELESS: NOT AT ALL
SUM OF ALL RESPONSES TO PHQ9 QUESTIONS 1 AND 2: 0
1. LITTLE INTEREST OR PLEASURE IN DOING THINGS: NOT AT ALL
CLINICAL INTERPRETATION OF PHQ2 SCORE: NO FURTHER SCREENING NEEDED

## 2025-08-05 ENCOUNTER — RESULTS FOLLOW-UP (OUTPATIENT)
Dept: INTERNAL MEDICINE | Age: 89
End: 2025-08-05

## 2025-08-05 LAB
ALBUMIN SERPL-MCNC: 3.9 G/DL (ref 3.4–5)
ALBUMIN/GLOB SERPL: 1.1 {RATIO} (ref 1–2.4)
ALP SERPL-CCNC: 90 UNITS/L (ref 45–117)
ALT SERPL-CCNC: 21 UNITS/L
ANION GAP SERPL CALC-SCNC: 11 MMOL/L (ref 7–19)
APPEARANCE UR: CLEAR
AST SERPL-CCNC: 28 UNITS/L
BASOPHILS # BLD: 0 K/MCL (ref 0–0.3)
BASOPHILS NFR BLD: 1 %
BILIRUB SERPL-MCNC: 0.7 MG/DL (ref 0.2–1)
BILIRUB UR QL STRIP: NEGATIVE
BUN SERPL-MCNC: 19 MG/DL (ref 6–20)
BUN/CREAT SERPL: 24 (ref 7–25)
CALCIUM SERPL-MCNC: 9.4 MG/DL (ref 8.4–10.2)
CHLORIDE SERPL-SCNC: 104 MMOL/L (ref 97–110)
CHOLEST SERPL-MCNC: 141 MG/DL
CHOLEST/HDLC SERPL: 3.5 {RATIO}
CO2 SERPL-SCNC: 25 MMOL/L (ref 21–32)
COLOR UR: NORMAL
CREAT SERPL-MCNC: 0.8 MG/DL (ref 0.67–1.17)
DEPRECATED RDW RBC: 48.5 FL (ref 39–50)
EGFRCR SERPLBLD CKD-EPI 2021: 85 ML/MIN/{1.73_M2}
EOSINOPHIL # BLD: 0 K/MCL (ref 0–0.5)
EOSINOPHIL NFR BLD: 1 %
ERYTHROCYTE [DISTWIDTH] IN BLOOD: 14.7 % (ref 11–15)
FASTING DURATION TIME PATIENT: 6 HOURS (ref 0–999)
FOLATE SERPL-MCNC: 5.5 NG/ML
GLOBULIN SER-MCNC: 3.6 G/DL (ref 2–4)
GLUCOSE SERPL-MCNC: 80 MG/DL (ref 70–99)
GLUCOSE UR STRIP-MCNC: NEGATIVE MG/DL
HCT VFR BLD CALC: 34.3 % (ref 39–51)
HDLC SERPL-MCNC: 40 MG/DL
HGB BLD-MCNC: 10.9 G/DL (ref 13–17)
HGB UR QL STRIP: NEGATIVE
IMM GRANULOCYTES # BLD AUTO: 0 K/MCL (ref 0–0.2)
IMM GRANULOCYTES # BLD: 0 %
IRON SERPL-MCNC: 72 MCG/DL (ref 65–175)
KETONES UR STRIP-MCNC: NEGATIVE MG/DL
LDLC SERPL CALC-MCNC: 72 MG/DL
LEUKOCYTE ESTERASE UR QL STRIP: NEGATIVE
LYMPHOCYTES # BLD: 0.7 K/MCL (ref 1–4)
LYMPHOCYTES NFR BLD: 16 %
MCH RBC QN AUTO: 28.8 PG (ref 26–34)
MCHC RBC AUTO-ENTMCNC: 31.8 G/DL (ref 32–36.5)
MCV RBC AUTO: 90.5 FL (ref 78–100)
MONOCYTES # BLD: 0.3 K/MCL (ref 0.3–0.9)
MONOCYTES NFR BLD: 8 %
NEUTROPHILS # BLD: 2.9 K/MCL (ref 1.8–7.7)
NEUTROPHILS NFR BLD: 74 %
NITRITE UR QL STRIP: NEGATIVE
NONHDLC SERPL-MCNC: 101 MG/DL
NRBC BLD MANUAL-RTO: 0 /100 WBC
PH UR STRIP: 5.5 [PH] (ref 5–7)
PLATELET # BLD AUTO: 195 K/MCL (ref 140–450)
POTASSIUM SERPL-SCNC: 4.3 MMOL/L (ref 3.4–5.1)
PROT SERPL-MCNC: 7.5 G/DL (ref 6.4–8.2)
PROT UR STRIP-MCNC: NEGATIVE MG/DL
PSA SERPL-MCNC: 0.24 NG/ML
RBC # BLD: 3.79 MIL/MCL (ref 4.5–5.9)
SODIUM SERPL-SCNC: 136 MMOL/L (ref 135–145)
SP GR UR STRIP: 1.01 (ref 1–1.03)
TRIGL SERPL-MCNC: 147 MG/DL
UROBILINOGEN UR STRIP-MCNC: 0.2 MG/DL
VIT B12 SERPL-MCNC: 515 PG/ML (ref 211–911)
WBC # BLD: 4 K/MCL (ref 4.2–11)